# Patient Record
Sex: FEMALE | Race: WHITE | Employment: UNEMPLOYED | URBAN - METROPOLITAN AREA
[De-identification: names, ages, dates, MRNs, and addresses within clinical notes are randomized per-mention and may not be internally consistent; named-entity substitution may affect disease eponyms.]

---

## 2021-03-09 ENCOUNTER — OFFICE VISIT (OUTPATIENT)
Dept: FAMILY MEDICINE CLINIC | Age: 61
End: 2021-03-09
Payer: MEDICAID

## 2021-03-09 VITALS
WEIGHT: 187.6 LBS | DIASTOLIC BLOOD PRESSURE: 55 MMHG | SYSTOLIC BLOOD PRESSURE: 103 MMHG | TEMPERATURE: 98.1 F | RESPIRATION RATE: 18 BRPM | HEIGHT: 68 IN | HEART RATE: 51 BPM | BODY MASS INDEX: 28.43 KG/M2 | OXYGEN SATURATION: 96 %

## 2021-03-09 DIAGNOSIS — M05.9 RHEUMATOID ARTHRITIS WITH POSITIVE RHEUMATOID FACTOR, INVOLVING UNSPECIFIED SITE (HCC): ICD-10-CM

## 2021-03-09 DIAGNOSIS — Z13.220 LIPID SCREENING: ICD-10-CM

## 2021-03-09 DIAGNOSIS — I48.91 ATRIAL FIBRILLATION, UNSPECIFIED TYPE (HCC): ICD-10-CM

## 2021-03-09 DIAGNOSIS — Z23 NEEDS FLU SHOT: ICD-10-CM

## 2021-03-09 DIAGNOSIS — K22.719 BARRETT'S ESOPHAGUS WITH DYSPLASIA: ICD-10-CM

## 2021-03-09 DIAGNOSIS — R73.03 PREDIABETES: ICD-10-CM

## 2021-03-09 DIAGNOSIS — I48.91 ATRIAL FIBRILLATION, UNSPECIFIED TYPE (HCC): Primary | ICD-10-CM

## 2021-03-09 DIAGNOSIS — M32.9 LUPUS (HCC): ICD-10-CM

## 2021-03-09 LAB
ALBUMIN SERPL-MCNC: 4.3 G/DL (ref 3.5–5.2)
ALP BLD-CCNC: 51 U/L (ref 35–104)
ALT SERPL-CCNC: 7 U/L (ref 0–32)
ANION GAP SERPL CALCULATED.3IONS-SCNC: 7 MMOL/L (ref 7–16)
AST SERPL-CCNC: 11 U/L (ref 0–31)
BASOPHILS ABSOLUTE: 0.03 E9/L (ref 0–0.2)
BASOPHILS RELATIVE PERCENT: 0.6 % (ref 0–2)
BILIRUB SERPL-MCNC: 0.3 MG/DL (ref 0–1.2)
BUN BLDV-MCNC: 18 MG/DL (ref 6–20)
CALCIUM SERPL-MCNC: 9.3 MG/DL (ref 8.6–10.2)
CHLORIDE BLD-SCNC: 102 MMOL/L (ref 98–107)
CHOLESTEROL, TOTAL: 208 MG/DL (ref 0–199)
CO2: 29 MMOL/L (ref 22–29)
CREAT SERPL-MCNC: 0.9 MG/DL (ref 0.5–1)
EOSINOPHILS ABSOLUTE: 0.11 E9/L (ref 0.05–0.5)
EOSINOPHILS RELATIVE PERCENT: 2 % (ref 0–6)
GFR AFRICAN AMERICAN: >60
GFR NON-AFRICAN AMERICAN: >60 ML/MIN/1.73
GLUCOSE BLD-MCNC: 96 MG/DL (ref 74–99)
HCT VFR BLD CALC: 42.7 % (ref 34–48)
HDLC SERPL-MCNC: 40 MG/DL
HEMOGLOBIN: 14 G/DL (ref 11.5–15.5)
IMMATURE GRANULOCYTES #: 0.01 E9/L
IMMATURE GRANULOCYTES %: 0.2 % (ref 0–5)
LDL CHOLESTEROL CALCULATED: 143 MG/DL (ref 0–99)
LYMPHOCYTES ABSOLUTE: 1.87 E9/L (ref 1.5–4)
LYMPHOCYTES RELATIVE PERCENT: 34.3 % (ref 20–42)
MCH RBC QN AUTO: 30.2 PG (ref 26–35)
MCHC RBC AUTO-ENTMCNC: 32.8 % (ref 32–34.5)
MCV RBC AUTO: 92 FL (ref 80–99.9)
MONOCYTES ABSOLUTE: 0.29 E9/L (ref 0.1–0.95)
MONOCYTES RELATIVE PERCENT: 5.3 % (ref 2–12)
NEUTROPHILS ABSOLUTE: 3.14 E9/L (ref 1.8–7.3)
NEUTROPHILS RELATIVE PERCENT: 57.6 % (ref 43–80)
PDW BLD-RTO: 12.8 FL (ref 11.5–15)
PLATELET # BLD: 158 E9/L (ref 130–450)
PMV BLD AUTO: 10.8 FL (ref 7–12)
POTASSIUM SERPL-SCNC: 4 MMOL/L (ref 3.5–5)
RBC # BLD: 4.64 E12/L (ref 3.5–5.5)
SODIUM BLD-SCNC: 138 MMOL/L (ref 132–146)
TOTAL PROTEIN: 7.1 G/DL (ref 6.4–8.3)
TRIGL SERPL-MCNC: 125 MG/DL (ref 0–149)
TSH SERPL DL<=0.05 MIU/L-ACNC: 2.15 UIU/ML (ref 0.27–4.2)
VLDLC SERPL CALC-MCNC: 25 MG/DL
WBC # BLD: 5.5 E9/L (ref 4.5–11.5)

## 2021-03-09 PROCEDURE — G8482 FLU IMMUNIZE ORDER/ADMIN: HCPCS | Performed by: FAMILY MEDICINE

## 2021-03-09 PROCEDURE — 36415 COLL VENOUS BLD VENIPUNCTURE: CPT | Performed by: FAMILY MEDICINE

## 2021-03-09 PROCEDURE — 99204 OFFICE O/P NEW MOD 45 MIN: CPT | Performed by: FAMILY MEDICINE

## 2021-03-09 PROCEDURE — 4004F PT TOBACCO SCREEN RCVD TLK: CPT | Performed by: FAMILY MEDICINE

## 2021-03-09 PROCEDURE — 90686 IIV4 VACC NO PRSV 0.5 ML IM: CPT | Performed by: FAMILY MEDICINE

## 2021-03-09 PROCEDURE — G8419 CALC BMI OUT NRM PARAM NOF/U: HCPCS | Performed by: FAMILY MEDICINE

## 2021-03-09 PROCEDURE — G8427 DOCREV CUR MEDS BY ELIG CLIN: HCPCS | Performed by: FAMILY MEDICINE

## 2021-03-09 PROCEDURE — 3017F COLORECTAL CA SCREEN DOC REV: CPT | Performed by: FAMILY MEDICINE

## 2021-03-09 PROCEDURE — 90471 IMMUNIZATION ADMIN: CPT | Performed by: FAMILY MEDICINE

## 2021-03-09 RX ORDER — PREDNISONE 1 MG/1
20 TABLET ORAL DAILY
Qty: 30 TABLET | Refills: 0 | Status: CANCELLED | OUTPATIENT
Start: 2021-03-09 | End: 2021-03-14

## 2021-03-09 RX ORDER — ESCITALOPRAM OXALATE 20 MG/1
20 TABLET ORAL DAILY
COMMUNITY
End: 2021-03-30 | Stop reason: SDUPTHER

## 2021-03-09 RX ORDER — OMEPRAZOLE 40 MG/1
40 CAPSULE, DELAYED RELEASE ORAL DAILY
COMMUNITY

## 2021-03-09 RX ORDER — HYDROXYCHLOROQUINE SULFATE 200 MG/1
200 TABLET, FILM COATED ORAL 2 TIMES DAILY
COMMUNITY

## 2021-03-09 RX ORDER — ACETAMINOPHEN 160 MG
2000 TABLET,DISINTEGRATING ORAL
COMMUNITY

## 2021-03-09 RX ORDER — TRAZODONE HYDROCHLORIDE 50 MG/1
50 TABLET ORAL NIGHTLY
COMMUNITY
End: 2022-02-16 | Stop reason: SDUPTHER

## 2021-03-09 RX ORDER — FOLIC ACID 1 MG/1
1 TABLET ORAL DAILY
COMMUNITY

## 2021-03-09 RX ORDER — METOPROLOL SUCCINATE 100 MG/1
100 TABLET, EXTENDED RELEASE ORAL DAILY
COMMUNITY
End: 2021-03-22

## 2021-03-09 RX ORDER — AMIODARONE HYDROCHLORIDE 200 MG/1
200 TABLET ORAL DAILY
COMMUNITY
End: 2021-04-21 | Stop reason: SDUPTHER

## 2021-03-09 ASSESSMENT — PATIENT HEALTH QUESTIONNAIRE - PHQ9: 2. FEELING DOWN, DEPRESSED OR HOPELESS: 0

## 2021-03-09 NOTE — PROGRESS NOTES
3/16/2021    Richar Leonard is a 64 y.o. femalehere for:    HPI:  CC- new patient, here to establish care. No medical records, but patient reports hx of stroke, lupus, yaya esophagus  EGD 3 years ago  Colonoscopy 3 years ago, polyps removed  No weight loss. No problems swallowing. No hematemesis. No voice changes. She is out of medications. Has been moved in [place] from [place] in November    Lupus diagnosed 15 years ago. Last Summer was last flare up  RA - Arthralgias generalized. Arthtiris has been well controlled  Prednisone 5 mg qd- off for 1 month now. Did not have any diarrhea or other sickness signs/symptoms    Stroke in 2010. That's when she was diagnosed with Afib. Was on warfarin, then xarelto, then eliquis. Had DVT before the stroke. Was told to be lifelong anticoagulated, but unsure if she had thrombophilia panel checked. Does have strong FHx of PE/DVT. Afib  On metoprolol ( usually takes 100 mg. If bp running low, takes 50 mg)   Takes Amiodarone as well  Used to follow regularly with cardiology and electrophysiology in [place]  Denies chest pain or palpitations  No gross bleeding    Pt on lexapro and trazodone.  Used to follow with neuropsych after the stroke as she had and continues to have slower processing of thoughts and outer information  Anxious  Mood is well controlled  Sleep is good with trazodone  No SI/HI      Pap smear: 2018 - had abnormal one 12 years ago, then all normal afterwards Mammogram 1 year and half ago- normal       BP Readings from Last 3 Encounters:   03/09/21 (!) 103/55     Current Outpatient Medications   Medication Sig Dispense Refill    Cholecalciferol (VITAMIN D3) 50 MCG (2000 UT) CAPS Take 2,000 Units by mouth      metoprolol succinate (TOPROL XL) 100 MG extended release tablet Take 100 mg by mouth daily Take 1 tablet by mouth daily      folic acid (FOLVITE) 1 MG tablet Take 1 mg by mouth daily      traZODone (DESYREL) 50 MG tablet Take 50 mg by mouth nightly      omeprazole (PRILOSEC) 40 MG delayed release capsule Take 40 mg by mouth daily Take 1 tablet by mouth daily      apixaban (ELIQUIS) 5 MG TABS tablet Take 5 mg by mouth 2 times daily Take 1 tablet by mouth BID      escitalopram (LEXAPRO) 20 MG tablet Take 20 mg by mouth daily Take 1 tablet by mouth every morning      hydroxychloroquine (PLAQUENIL) 200 MG tablet Take 200 mg by mouth 2 times daily Take 1 tablet by mouth twice a day      methotrexate (RHEUMATREX) 2.5 MG chemo tablet Take 2.5 mg by mouth once a week Take 6 tablets by mouth weekly      Multiple Minerals (CALCIUM/MAGNESIUM/ZINC PO) Take by mouth      amiodarone (CORDARONE) 200 MG tablet Take 200 mg by mouth daily Take 1 tablet by mouth daily       No current facility-administered medications for this visit. Allergies   Allergen Reactions    Morphine Other (See Comments)     Rapid drop in BP     Phenergan [Promethazine]     Ativan [Lorazepam] Anxiety    Penicillins Rash       Past Medical & Surgical History:      Diagnosis Date    Anxiety and depression     Atrial fibrillation ([facility]     Fried's esophagus with dysplasia     Hx of deep venous thrombosis     Lupus ([facility] [address]     Stroke [facility])      History reviewed. No pertinent surgical history.     Family History:      Problem Relation Age of Onset    Stroke Mother         minor    Colon Polyps Mother         precanceous    Heart Attack Father 58        ortic aneurism as well    Colon Cancer Brother 40    Deep Vein Thrombosis Brother         And PE    Stroke Brother     Colon Cancer Paternal Grandmother 80    Colon Cancer Brother 39    Deep Vein Thrombosis Brother     Seizures Brother        Social History:  Social History     Tobacco Use    Smoking status: Current Every Day Smoker     Packs/day: 0.25     Years: 40.00     Pack years: 10.00     Start date: 1980    Smokeless tobacco: Never Used   Substance Use Topics    Alcohol use: Not on file       Immunization History   Administered Date(s) Administered    Influenza, Quadv, IM, PF (6 mo and older Fluzone, Flulaval, Fluarix, and 3 yrs and older Afluria) 03/09/2021       Review of Systems   Constitutional: Negative for appetite change, chills, fever and unexpected weight change. HENT: Positive for tinnitus (chronic). Negative for congestion, sinus pressure and sore throat. Eyes: Negative for discharge and visual disturbance. Respiratory: Negative for cough, shortness of breath and wheezing. Cardiovascular: Negative for chest pain and leg swelling. Gastrointestinal: Negative for abdominal pain, blood in stool and vomiting. Endocrine: Negative for cold intolerance, heat intolerance and polyuria. Genitourinary: Negative for dysuria and hematuria. Musculoskeletal: Positive for arthralgias. Negative for neck pain and neck stiffness. Skin: Positive for rash (when having lupus flare ups). Negative for wound. Neurological: Negative for weakness, numbness and headaches. Psychiatric/Behavioral: Positive for confusion, decreased concentration and sleep disturbance. Negative for agitation and suicidal ideas. The patient is nervous/anxious. VS:  BP (!) 103/55   Pulse 51   Temp 98.1 °F (36.7 °C)   Resp 18   Ht 5' 7.5\" (1.715 m)   Wt 187 lb 9.6 oz (85.1 kg)   SpO2 96%   BMI 28.95 kg/m²     Physical Exam  Vitals signs reviewed. Constitutional:       General: She is not in acute distress. Appearance: Normal appearance. HENT:      Head: Normocephalic and atraumatic. Right Ear: Tympanic membrane normal.      Left Ear: Tympanic membrane normal.      Nose: No congestion. Mouth/Throat:      Pharynx: No oropharyngeal exudate or posterior oropharyngeal erythema. Eyes:      General: No scleral icterus. Conjunctiva/sclera: Conjunctivae normal.      Pupils: Pupils are equal, round, and reactive to light. Neck:      Musculoskeletal: Normal range of motion and neck supple. Cardiovascular:      Rate and Rhythm: Normal rate and regular rhythm. Heart sounds: Normal heart sounds. No murmur. Pulmonary:      Effort: Pulmonary effort is normal. No respiratory distress. Breath sounds: Normal breath sounds. No wheezing. Abdominal:      General: There is no distension. Palpations: Abdomen is soft. Tenderness: There is no abdominal tenderness. Musculoskeletal:      Right lower leg: No edema. Left lower leg: No edema. Lymphadenopathy:      Cervical: No cervical adenopathy. Skin:     General: Skin is warm. Findings: No rash. Neurological:      General: No focal deficit present. Mental Status: She is alert and oriented to person, place, and time. Cranial Nerves: No cranial nerve deficit. Motor: No weakness. Psychiatric:         Mood and Affect: Mood normal.         Behavior: Behavior normal.         Assessment/Plan:  Anthony Cross was seen today for new patient, tinnitus and lupus. Diagnoses and all orders for this visit:    Atrial fibrillation, unspecified type ([facility]  Sinus rhythm today. Continue with Eliquis, amiodarone. Continue taking Metoprolol 50 mg as instructed when having low bp  [id number] Score: 3  Referred to electrophysiology. -     CBC WITH AUTO DIFFERENTIAL; Future  -     COMPREHENSIVE METABOLIC PANEL; Future  -     TSH without Reflex; Future  -     [facility] - Alfredo Bridges DO, Electrophysiology, [place]    Fried's esophagus with dysplasia  Continue taking omeprazole. Due for EGD. Referred to general surgery. -     Marizol Dc MD, General Surgery, [facility]    Rheumatoid arthritis with positive rheumatoid factor, involving unspecified site [facility])  Continue methotrexate and prednisone.  Referred to rheumatolog    -     External Referral To Rheumatology    Lupus ([facility] [address]  As above   -     External Referral To Rheumatology    Needs flu shot  -     INFLUENZA, QUADV, 3 YRS AND OLDER, IM PF, PREFILL SYR OR SDV, 0.5ML (AFLURIA QUADV, PF)    Prediabetes  -     HEMOGLOBIN A1C; Future    Lipid screening  -     LIPID PANEL; Future    Medical Decision Making  Number and Complexity of Problems Addressed:   Stable Chronic Illnesses: afib, yaya esophagus, lupus, RA  Level of Problems Addressed: Moderate (1+ chronic illness with exacerbation, progression, or side effects of treatment / 2+ stable chronic illnesses / 1 undiagnosed new problem with uncertain prognosis / 1 acute illness with systemic symptoms / 1 acute complicated injury)  Amount and/or Complexity of Data to be Reviewed and Analyzed: Moderate (1 of 3 Categories) Category 1 (Any three of the following, can duplicate except historian) Same as Limited Category 1 plus Assessment Requiring Independent Historian / Category 2 Independent Interpretation of Tests (not separately reported) / Category 3 Discussion of Management or Test Interpretation with External Physician  Risk of Complications and/or Morbidity or Mortality of Patient Management:   Moderate risk of morbidity from additional diagnostic testing or treatment (ex. prescription drug management / minor surgery with risk factors / elective major surgery without risk factors / diagnosis or treatment significantly limited by social determinants of health)  Today's visit has a medical decision making level of Moderate. Follow up: In 2 weeks for anxiety and Health Maintenance     Patient agrees with the above stated plan. Toy Clay received counseling on the following healthy behaviors: nutrition, exercise and medication adherence.     Vince Mcclain MD  PGY-2 Family Medicine

## 2021-03-09 NOTE — PROGRESS NOTES
S: 64 y.o. female with   Chief Complaint   Patient presents with    New Patient    Tinnitus     constant - started a few months ago - started off as occasionally and now constant.  Lupus     out of medications      New to the area from [place]  Lupus - needs a rheumatologist.    Barrets esophagus   Hx stroke in 2010. Has Afib.    +tinnintus b/l. Mostly at night. O: VS:  height is 5' 7.5\" (1.715 m) and weight is 187 lb 9.6 oz (85.1 kg). Her temperature is 98.1 °F (36.7 °C). Her blood pressure is 103/55 (abnormal) and her pulse is 51. Her respiration is 18 and oxygen saturation is 96%. BP Readings from Last 3 Encounters:   03/09/21 (!) 103/55     See resident note      Impression/Plan:   1) Afib - decrease metoprolol, HR is ~50 and BP is low, cont DOAC. 2) Lupus - refer to rheum  3) Barrets - refer to surg/GI for surveillance  Obtain records. Health Maintenance Due   Topic Date Due    TSH testing  Never done    Hepatitis C screen  Never done    HIV screen  Never done    DTaP/Tdap/Td vaccine (1 - Tdap) Never done    Cervical cancer screen  Never done    Lipid screen  Never done    Diabetes screen  Never done    Breast cancer screen  Never done    Shingles Vaccine (1 of 2) Never done    Colon cancer screen colonoscopy  Never done    Flu vaccine (1) Never done         Attending Physician Statement  I have discussed the case, including pertinent history and exam findings with the resident. I agree with the documented assessment and plan.       J Carlos Arnold MD

## 2021-03-10 LAB — HBA1C MFR BLD: 6 % (ref 4–5.6)

## 2021-03-15 PROBLEM — I48.91 ATRIAL FIBRILLATION (HCC): Status: ACTIVE | Noted: 2021-03-15

## 2021-03-15 PROBLEM — Z86.73 REMOTE HISTORY OF STROKE: Status: ACTIVE | Noted: 2021-03-15

## 2021-03-15 PROBLEM — F32.A ANXIETY AND DEPRESSION: Status: ACTIVE | Noted: 2021-03-15

## 2021-03-15 PROBLEM — K22.719 BARRETT'S ESOPHAGUS WITH DYSPLASIA: Status: ACTIVE | Noted: 2021-03-15

## 2021-03-15 PROBLEM — Z86.718 HX OF DEEP VENOUS THROMBOSIS: Status: ACTIVE | Noted: 2021-03-15

## 2021-03-15 PROBLEM — F41.9 ANXIETY AND DEPRESSION: Status: ACTIVE | Noted: 2021-03-15

## 2021-03-15 PROBLEM — M05.9 RHEUMATOID ARTHRITIS WITH POSITIVE RHEUMATOID FACTOR (HCC): Status: ACTIVE | Noted: 2021-03-15

## 2021-03-15 PROBLEM — M32.9 LUPUS (HCC): Status: ACTIVE | Noted: 2021-03-15

## 2021-03-15 ASSESSMENT — ENCOUNTER SYMPTOMS
ABDOMINAL PAIN: 0
WHEEZING: 0
VOMITING: 0
EYE DISCHARGE: 0
SINUS PRESSURE: 0
SORE THROAT: 0
BLOOD IN STOOL: 0
SHORTNESS OF BREATH: 0
COUGH: 0

## 2021-03-17 ENCOUNTER — OFFICE VISIT (OUTPATIENT)
Dept: SURGERY | Age: 61
End: 2021-03-17
Payer: MEDICAID

## 2021-03-17 ENCOUNTER — TELEPHONE (OUTPATIENT)
Dept: SURGERY | Age: 61
End: 2021-03-17

## 2021-03-17 VITALS
WEIGHT: 187 LBS | BODY MASS INDEX: 29.35 KG/M2 | HEART RATE: 54 BPM | SYSTOLIC BLOOD PRESSURE: 102 MMHG | HEIGHT: 67 IN | DIASTOLIC BLOOD PRESSURE: 61 MMHG | RESPIRATION RATE: 16 BRPM | OXYGEN SATURATION: 97 % | TEMPERATURE: 97.5 F

## 2021-03-17 DIAGNOSIS — Z86.010 HX OF COLONIC POLYPS: ICD-10-CM

## 2021-03-17 DIAGNOSIS — K22.70 BARRETT'S ESOPHAGUS WITHOUT DYSPLASIA: Primary | ICD-10-CM

## 2021-03-17 PROCEDURE — G8482 FLU IMMUNIZE ORDER/ADMIN: HCPCS | Performed by: SURGERY

## 2021-03-17 PROCEDURE — G8419 CALC BMI OUT NRM PARAM NOF/U: HCPCS | Performed by: SURGERY

## 2021-03-17 PROCEDURE — 99244 OFF/OP CNSLTJ NEW/EST MOD 40: CPT | Performed by: SURGERY

## 2021-03-17 PROCEDURE — G8427 DOCREV CUR MEDS BY ELIG CLIN: HCPCS | Performed by: SURGERY

## 2021-03-17 RX ORDER — FAMOTIDINE 40 MG/1
40 TABLET, FILM COATED ORAL EVERY EVENING
Qty: 30 TABLET | Refills: 3 | Status: SHIPPED | OUTPATIENT
Start: 2021-03-17

## 2021-03-17 NOTE — TELEPHONE ENCOUNTER
Prior Authorization Form:      DEMOGRAPHICS:                     Patient Name:  Alexandra Aguilar  Patient :  1960            Insurance:  Payor: [facility] / Plan: Doctor kinetic / Product Type: *No Product type* /   Insurance ID Number:    Payor/Plan Subscr  Sex Relation Sub.  Ins. ID Effective Group Num   1. [facility] 1960 Female Self [id number]                                    [id number]         DIAGNOSIS & PROCEDURE:                       Procedure/Operation: Colonoscopy / EGD           CPT Code: 72360 / 98511    Diagnosis:  Barretts esophagus / dysphagia / h/o of colon polyps     ICD10 Code: K22.70 / R13.10 / Z86.010    Location:  [facility]    Surgeon:  Duc Bills    SCHEDULING INFORMATION:                          Date: 21    Time: 1100              Anesthesia:  MAC/TIVA                                                       Status:  Outpatient        Special Comments:        Electronically signed by Catherine Reyes MA on 3/17/2021 at 2:55 PM

## 2021-03-22 ENCOUNTER — OFFICE VISIT (OUTPATIENT)
Dept: NON INVASIVE DIAGNOSTICS | Age: 61
End: 2021-03-22
Payer: MEDICAID

## 2021-03-22 VITALS
HEART RATE: 51 BPM | RESPIRATION RATE: 16 BRPM | WEIGHT: 186.8 LBS | OXYGEN SATURATION: 96 % | HEIGHT: 67 IN | BODY MASS INDEX: 29.32 KG/M2 | SYSTOLIC BLOOD PRESSURE: 98 MMHG | DIASTOLIC BLOOD PRESSURE: 70 MMHG

## 2021-03-22 DIAGNOSIS — R94.31 EKG ABNORMALITIES: ICD-10-CM

## 2021-03-22 DIAGNOSIS — I48.91 ATRIAL FIBRILLATION, UNSPECIFIED TYPE (HCC): Primary | ICD-10-CM

## 2021-03-22 PROCEDURE — G8419 CALC BMI OUT NRM PARAM NOF/U: HCPCS | Performed by: INTERNAL MEDICINE

## 2021-03-22 PROCEDURE — 4004F PT TOBACCO SCREEN RCVD TLK: CPT | Performed by: INTERNAL MEDICINE

## 2021-03-22 PROCEDURE — 99204 OFFICE O/P NEW MOD 45 MIN: CPT | Performed by: INTERNAL MEDICINE

## 2021-03-22 PROCEDURE — G8482 FLU IMMUNIZE ORDER/ADMIN: HCPCS | Performed by: INTERNAL MEDICINE

## 2021-03-22 PROCEDURE — G8427 DOCREV CUR MEDS BY ELIG CLIN: HCPCS | Performed by: INTERNAL MEDICINE

## 2021-03-22 PROCEDURE — 3017F COLORECTAL CA SCREEN DOC REV: CPT | Performed by: INTERNAL MEDICINE

## 2021-03-22 PROCEDURE — 93000 ELECTROCARDIOGRAM COMPLETE: CPT | Performed by: INTERNAL MEDICINE

## 2021-03-22 RX ORDER — METOPROLOL SUCCINATE 50 MG/1
50 TABLET, EXTENDED RELEASE ORAL DAILY
COMMUNITY
End: 2021-05-04 | Stop reason: SDUPTHER

## 2021-03-22 ASSESSMENT — ENCOUNTER SYMPTOMS
NAUSEA: 0
CHEST TIGHTNESS: 0
COLOR CHANGE: 0
WHEEZING: 0
ABDOMINAL DISTENTION: 0
COUGH: 0
SINUS PRESSURE: 0
EYE REDNESS: 0
ABDOMINAL PAIN: 0
DIARRHEA: 0
SHORTNESS OF BREATH: 0

## 2021-03-22 NOTE — PROGRESS NOTES
Pt tolerated placement of 30 day preventice patch and understood use and return of device. [id number].   Electronically signed by Vivian Villagomez MA on 3/22/2021 at 2:50 PM

## 2021-03-22 NOTE — PROGRESS NOTES
precanceous    Heart Attack Father 58        ortic aneurism as well    Colon Cancer Brother 40    Deep Vein Thrombosis Brother         And PE    Stroke Brother     Colon Cancer Paternal Grandmother 80    Colon Cancer Brother 39    Deep Vein Thrombosis Brother     Seizures Brother        SOCIAL HISTORY   Social History     Socioeconomic History    Marital status:      Spouse name: Not on file    Number of children: Not on file    Years of education: Not on file    Highest education level: Not on file   Occupational History    Not on file   Social Needs    Financial resource strain: Not on file    Food insecurity     Worry: Not on file     Inability: Not on file    Transportation needs     Medical: Not on file     Non-medical: Not on file   Tobacco Use    Smoking status: Current Every Day Smoker     Packs/day: 0.25     Years: 40.00     Pack years: 10.00     Start date: 1980    Smokeless tobacco: Never Used   Substance and Sexual Activity    Alcohol use: Not Currently     Comment: rare glass of wine    Drug use: Not Currently    Sexual activity: Not on file   Lifestyle    Physical activity     Days per week: Not on file     Minutes per session: Not on file    Stress: Not on file   Relationships    Social connections     Talks on phone: Not on file     Gets together: Not on file     Attends [employer] service: Not on file     Active member of club or organization: Not on file     Attends meetings of clubs or organizations: Not on file     Relationship status: Not on file    Intimate partner violence     Fear of current or ex partner: Not on file     Emotionally abused: Not on file     Physically abused: Not on file     Forced sexual activity: Not on file   Other Topics Concern    Not on file   Social History Narrative    Not on file        History reviewed. No pertinent surgical history.     Current Outpatient Medications   Medication Sig Dispense Refill    metoprolol succinate (TOPROL XL) 50 MG extended release tablet Take 50 mg by mouth daily      famotidine (PEPCID) 40 MG tablet Take 1 tablet by mouth every evening 30 tablet 3    Cholecalciferol (VITAMIN D3) 50 MCG (2000 UT) CAPS Take 2,000 Units by mouth      folic acid (FOLVITE) 1 MG tablet Take 1 mg by mouth daily      traZODone (DESYREL) 50 MG tablet Take 50 mg by mouth nightly      omeprazole (PRILOSEC) 40 MG delayed release capsule Take 40 mg by mouth daily Take 1 tablet by mouth daily      apixaban (ELIQUIS) 5 MG TABS tablet Take 5 mg by mouth 2 times daily Take 1 tablet by mouth BID      escitalopram (LEXAPRO) 20 MG tablet Take 20 mg by mouth daily Take 1 tablet by mouth every morning      hydroxychloroquine (PLAQUENIL) 200 MG tablet Take 200 mg by mouth 2 times daily Take 1 tablet by mouth twice a day      methotrexate (RHEUMATREX) 2.5 MG chemo tablet Take 2.5 mg by mouth once a week Take 6 tablets by mouth weekly      Multiple Minerals (CALCIUM/MAGNESIUM/ZINC PO) Take by mouth      amiodarone (CORDARONE) 200 MG tablet Take 200 mg by mouth daily Take 1 tablet by mouth daily       No current facility-administered medications for this visit. Allergies   Allergen Reactions    Morphine Other (See Comments)     Rapid drop in BP     Phenergan [Promethazine]     Ativan [Lorazepam] Anxiety    Penicillins Rash           ROS:   Review of Systems   Constitutional: Negative for fatigue and fever. HENT: Negative for congestion, nosebleeds and sinus pressure. Eyes: Negative for redness and visual disturbance. Respiratory: Negative for cough, chest tightness, shortness of breath and wheezing. Cardiovascular: Positive for palpitations. Negative for chest pain and leg swelling. Gastrointestinal: Negative for abdominal distention, abdominal pain, diarrhea and nausea. Endocrine: Negative for cold intolerance, heat intolerance, polydipsia and polyphagia.    Genitourinary: Negative for difficulty urinating, frequency and urgency. Musculoskeletal: Negative for arthralgias, joint swelling and myalgias. Skin: Negative for color change and wound. Neurological: Negative for dizziness, syncope, weakness and numbness. Psychiatric/Behavioral: Negative for agitation, behavioral problems, confusion, decreased concentration, hallucinations and suicidal ideas. The patient is not nervous/anxious. PHYSICAL EXAM:  Vitals:    03/22/21 1357   BP: 98/70   Site: Right Upper Arm   Position: Sitting   Cuff Size: Medium Adult   Pulse: 51   Resp: 16   SpO2: 96%   Weight: 186 lb 12.8 oz (84.7 kg)   Height: 5' 7\" (1.702 m)     Physical Exam  Vitals signs reviewed. Constitutional:       Appearance: Normal appearance. HENT:      Head: Normocephalic. Mouth/Throat:      Mouth: Mucous membranes are moist.      Pharynx: Oropharynx is clear. Eyes:      Conjunctiva/sclera: Conjunctivae normal.   Neck:      Musculoskeletal: Normal range of motion and neck supple. Vascular: No carotid bruit. Cardiovascular:      Rate and Rhythm: Normal rate and regular rhythm. Pulses: Normal pulses. Heart sounds: Normal heart sounds. Pulmonary:      Effort: Pulmonary effort is normal.      Breath sounds: Normal breath sounds. No rales. Chest:      Chest wall: No tenderness. Abdominal:      General: Bowel sounds are normal.      Palpations: Abdomen is soft. Musculoskeletal: Normal range of motion. Skin:     General: Skin is warm. Neurological:      General: No focal deficit present. Mental Status: She is alert and oriented to person, place, and time. Psychiatric:         Mood and Affect: Mood normal.         Behavior: Behavior normal.         Thought Content:  Thought content normal.          Pertinent Labs:  CBC:   WBC (E9/L)   Date Value   03/09/2021 5.5     Hemoglobin (g/dL)   Date Value   03/09/2021 14.0     Hematocrit (%)   Date Value   03/09/2021 42.7     Platelets (F7/J)   Date Value   03/09/2021 158      BMP: Sodium (mmol/L)   Date Value   03/09/2021 138     Potassium (mmol/L)   Date Value   03/09/2021 4.0     Chloride (mmol/L)   Date Value   03/09/2021 102     CO2 (mmol/L)   Date Value   03/09/2021 29     BUN (mg/dL)   Date Value   03/09/2021 18     CREATININE (mg/dL)   Date Value   03/09/2021 0.9     Glucose (mg/dL)   Date Value   03/09/2021 96     Calcium (mg/dL)   Date Value   03/09/2021 9.3        TSH:   TSH (uIU/mL)   Date Value   03/09/2021 2.150      Lipid Profile:   Triglycerides (mg/dL)   Date Value   03/09/2021 125     HDL (mg/dL)   Date Value   03/09/2021 40     LDL Calculated (mg/dL)   Date Value   03/09/2021 143 (H)     Cholesterol, Total (mg/dL)   Date Value   03/09/2021 208 (H)      Hemoglobin A1C:   Hemoglobin A1C (%)   Date Value   03/09/2021 6.0 (H)           Pertinent Cardiac Testing:     ECG 3/22/2021: sinus bradycardia, rate: 51 bpm - see scanned cardiology    I have independently reviewed all of the ECGs and rhythm strips per above    I have personally reviewed the laboratory, cardiac diagnostic and radiographic testing as outlined above: We have requested previous records. 1. Atrial fibrillation, unspecified type ([facility] [address]         ASSESSMENT & PLAN    1. Atrial fibrillation  - Dx in 2010 following a CVA  - [id number] Score: 3  - On Eliquis, Amiodarone and Toprol PRN  - presents in [place].  - She is having increasing palpitations and has been on chronic amiodarone therapy. I recommended a 30 day event monitor to get a better sense of how much arrhythmia burden she has. Amiodarone is not the ideal choice for her given young age and deleterious effects long term. Her BP runs low and she does not really tolerate beta-blocker. She uses it prn ~ every 7 - 10 days.    - Check TTE to establish heart structure and function  - Re-education on importance of well controlled HTN (goal BP < 130/80), adequate weight control (goal BMI of < 27), physical activity consisting of moderate cardiopulmonary exercise up to a goal of 250 min/wk, daily compliance with CPAP in treating sleep apnea, smoking cessation and limited ETOH intake. 2. Lupus  - Dx 15 years ago  - was on Prednisone and currently off    3. CVA  - occurred 2010  - report memory deficits and right leg weakness    4. Hx of DVT  - prior to CVA in  2010    5. Barett's esophagus  - On omeprazole and following with general surgery    6. Anxiety/ depression  - Currently on Lexapro    7. Tobacco abuse  - reports using 1 pack a day  - encouraged cessation. Plan  1. No changes in medications at this time. 2. Will obtain a 30 day MCOT to determine any arrhythmias. 3. Will obtain a TTE for updated structure and function. 4. Follow up in 6 weeks or sooner PRN. Encouraged the patient to call the office for any questions or concerns. I have spent a total of 40 minutes with the patient and his/her family reviewing the above stated recommendations. A total of >50% of that time involved face-to-face time providing counseling and or coordination of care with the other providers. Thank you for allowing me to participate in your patient's care.     Andres Lester MD  Cardiac Electrophysiology  [address] [place] [address]

## 2021-03-23 ENCOUNTER — OFFICE VISIT (OUTPATIENT)
Dept: FAMILY MEDICINE CLINIC | Age: 61
End: 2021-03-23
Payer: MEDICAID

## 2021-03-23 VITALS
SYSTOLIC BLOOD PRESSURE: 114 MMHG | HEIGHT: 67 IN | BODY MASS INDEX: 29.35 KG/M2 | TEMPERATURE: 97.4 F | DIASTOLIC BLOOD PRESSURE: 62 MMHG | HEART RATE: 52 BPM | RESPIRATION RATE: 18 BRPM | OXYGEN SATURATION: 98 % | WEIGHT: 187 LBS

## 2021-03-23 DIAGNOSIS — Z12.31 ENCOUNTER FOR SCREENING MAMMOGRAM FOR MALIGNANT NEOPLASM OF BREAST: ICD-10-CM

## 2021-03-23 DIAGNOSIS — F41.9 ANXIETY AND DEPRESSION: Primary | ICD-10-CM

## 2021-03-23 DIAGNOSIS — F32.A ANXIETY AND DEPRESSION: Primary | ICD-10-CM

## 2021-03-23 PROCEDURE — G8482 FLU IMMUNIZE ORDER/ADMIN: HCPCS | Performed by: FAMILY MEDICINE

## 2021-03-23 PROCEDURE — G8419 CALC BMI OUT NRM PARAM NOF/U: HCPCS | Performed by: FAMILY MEDICINE

## 2021-03-23 PROCEDURE — G8427 DOCREV CUR MEDS BY ELIG CLIN: HCPCS | Performed by: FAMILY MEDICINE

## 2021-03-23 PROCEDURE — 99213 OFFICE O/P EST LOW 20 MIN: CPT | Performed by: FAMILY MEDICINE

## 2021-03-23 PROCEDURE — 3017F COLORECTAL CA SCREEN DOC REV: CPT | Performed by: FAMILY MEDICINE

## 2021-03-23 PROCEDURE — 4004F PT TOBACCO SCREEN RCVD TLK: CPT | Performed by: FAMILY MEDICINE

## 2021-03-23 RX ORDER — SODIUM CHLORIDE 9 MG/ML
INJECTION, SOLUTION INTRAVENOUS CONTINUOUS
Status: CANCELLED | OUTPATIENT
Start: 2021-03-23

## 2021-03-23 ASSESSMENT — ENCOUNTER SYMPTOMS
BLOOD IN STOOL: 0
SORE THROAT: 0
DIARRHEA: 0
EYE REDNESS: 0
BACK PAIN: 0
VOMITING: 0
ABDOMINAL PAIN: 0
COUGH: 0
WHEEZING: 0
PHOTOPHOBIA: 0
CONSTIPATION: 0
SHORTNESS OF BREATH: 0
NAUSEA: 0

## 2021-03-23 NOTE — PROGRESS NOTES
Consult Note    Dear Jimena Wallis MD, thank you for referring Delbert Marley for evaluation. Reason for Consult: History of Fried's, history of colon polyps    HISTORY OF PRESENT ILLNESS:    The patient is a 64 y.o. female who presents with reported history of Fried's esophagus with dysplasia. [place]. She denies having had any ablation type procedure. She reports her reflux is controlled presently on omeprazole. She also has history of colon polyps. She has history of 3 first-degree relatives with colon cancer. Denies any rectal bleeding diarrhea or constipation. Past Medical History:   Diagnosis Date    Anxiety and depression     Atrial fibrillation ([facility] [address]     Fried's esophagus with dysplasia     Hx of deep venous thrombosis     Lupus ([facility] [address] [facility]     Stroke [facility])        History reviewed. No pertinent surgical history. Prior to Admission medications    Medication Sig Start Date End Date Taking?  Authorizing Provider   famotidine (PEPCID) 40 MG tablet Take 1 tablet by mouth every evening 3/17/21  Yes Monica Cole DO   Cholecalciferol (VITAMIN D3) 50 MCG (2000 UT) CAPS Take 2,000 Units by mouth   Yes Historical Provider, MD   folic acid (FOLVITE) 1 MG tablet Take 1 mg by mouth daily   Yes Historical Provider, MD   traZODone (DESYREL) 50 MG tablet Take 50 mg by mouth nightly   Yes Historical Provider, MD   omeprazole (PRILOSEC) 40 MG delayed release capsule Take 40 mg by mouth daily Take 1 tablet by mouth daily   Yes Historical Provider, MD   apixaban (ELIQUIS) 5 MG TABS tablet Take 5 mg by mouth 2 times daily Take 1 tablet by mouth BID   Yes Historical Provider, MD   escitalopram (LEXAPRO) 20 MG tablet Take 20 mg by mouth daily Take 1 tablet by mouth every morning   Yes Historical Provider, MD   hydroxychloroquine (PLAQUENIL) 200 MG tablet Take 200 mg by mouth 2 times daily Take 1 tablet by mouth twice a day   Yes Historical Provider, MD methotrexate (RHEUMATREX) 2.5 MG chemo tablet Take 2.5 mg by mouth once a week Take 6 tablets by mouth weekly   Yes Historical Provider, MD   Multiple Minerals (CALCIUM/MAGNESIUM/ZINC PO) Take by mouth   Yes Historical Provider, MD   amiodarone (CORDARONE) 200 MG tablet Take 200 mg by mouth daily Take 1 tablet by mouth daily   Yes Historical Provider, MD   metoprolol succinate (TOPROL XL) 50 MG extended release tablet Take 50 mg by mouth daily    Historical Provider, MD       Scheduled Meds:  ContinuousInfusions:  PRN Meds:    Allergies   Allergen Reactions    Morphine Other (See Comments)     Rapid drop in BP     Phenergan [Promethazine]     Ativan [Lorazepam] Anxiety    Penicillins Rash       Social History     Socioeconomic History    Marital status:      Spouse name: Not on file    Number of children: Not on file    Years of education: Not on file    Highest education level: Not on file   Occupational History    Not on file   Social Needs    Financial resource strain: Not on file    Food insecurity     Worry: Not on file     Inability: Not on file    Transportation needs     Medical: Not on file     Non-medical: Not on file   Tobacco Use    Smoking status: Current Every Day Smoker     Packs/day: 0.25     Years: 40.00     Pack years: 10.00     Start date: 1980    Smokeless tobacco: Never Used   Substance and Sexual Activity    Alcohol use: Not Currently     Comment: rare glass of wine    Drug use: Not Currently    Sexual activity: Not on file   Lifestyle    Physical activity     Days per week: Not on file     Minutes per session: Not on file    Stress: Not on file   Relationships    Social connections     Talks on phone: Not on file     Gets together: Not on file     Attends Buddhism service: Not on file     Active member of club or organization: Not on file     Attends meetings of clubs or organizations: Not on file     Relationship status: Not on file    Intimate partner violence Fear of current or ex partner: Not on file     Emotionally abused: Not on file     Physically abused: Not on file     Forced sexual activity: Not on file   Other Topics Concern    Not on file   Social History Narrative    Not on file       Family History   Problem Relation Age of Onset    Stroke Mother         minor    Colon Polyps Mother         precanceous    Heart Attack Father 58        ortic aneurism as well    Colon Cancer Brother 40    Deep Vein Thrombosis Brother         And PE    Stroke Brother     Colon Cancer Paternal Grandmother 80    Colon Cancer Brother 39    Deep Vein Thrombosis Brother     Seizures Brother        Review Of Systems:   Review of Systems   Constitutional: Negative for chills and fever. HENT: Negative for ear pain, nosebleeds, sore throat and tinnitus. Eyes: Negative for photophobia and redness. Respiratory: Negative for cough, shortness of breath and wheezing. Cardiovascular: Negative for chest pain and palpitations. Gastrointestinal: Negative for abdominal pain, blood in stool, constipation, diarrhea, nausea and vomiting. Endocrine: Negative for polydipsia. Genitourinary: Negative for dysuria, hematuria and urgency. Musculoskeletal: Negative for back pain and neck pain. Skin: Negative for rash. Neurological: Negative for dizziness, tremors and seizures. Hematological: Does not bruise/bleed easily. All other systems reviewed and are negative.        Physical Exam:  Vitals:    03/17/21 1404   BP: 102/61   Site: Left Upper Arm   Position: Sitting   Cuff Size: Medium Adult   Pulse: 54   Resp: 16   Temp: 97.5 °F (36.4 °C)   TempSrc: Temporal   SpO2: 97%   Weight: 187 lb (84.8 kg)   Height: 5' 7\" (1.702 m)       General: Well nourished, well developed, no acute distress  Eyes:  PERRL   Conjunctiva unremarkable   ENT:  TM's intact bilaterally, no effusion   Nasal:  No mucosal edema     No nasal drainage   Oral:  mucosa moist and pink   Neck:  Supple   No palpable cervical lymphoadenopathy   Thyroid without mass or enlargement  Resp: Lungs CTAB   Equal and adequate air exchange without accessory muscle use   No rales, rhonchi or wheeze  CV: S1S2 RRR   No murmur   Intact distal pulses   No edema  GI: Abdomen Soft, non tender, non distended   Normoactive bowel sounds   No palpable hepatosplenomegaly  MS: Physiologic ROM of all extremities    Intact distal pulses   No clubbing or cyanosis   Skin Warm and dry; no rash or lesion  Neuro: Alert and oriented; normal and intact dtr's   Psych: Euthymic mood, congruent affect      No results found. Assessment/Plan:  3 61-year-old female with history of Fried's esophagus and possibly dysplasia. Also with history of colon polyps. We will plan for EGD and colonoscopy. The risks and benefits of the procedure were explained to the patient, including risks of bleeding and perforation. The patient expressed understanding of these risks.         Electronically signed by Roberto Arana DO on 3/23/21 at 10:06 AM EDT

## 2021-03-23 NOTE — PROGRESS NOTES
[facility]  Precepting Note    Subjective:  Mood  Hx of anx and dep over time. Hx of stroke 10 years ago, physical sequelae resolved but has had trouble with focus, memory, concentration. Too much stimulation causes anxiety and distraction. Mood is low as she will feel frustrated from these things. 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 Has followed with neuropsych. She continues lexapro 20mg. Also trazodone nightly prn 3-4 times weekly (25mg). Anx improved lately. Situations improved. ROS otherwise negative     Past medical, surgical, family and social history were reviewed, non-contributory, and unchanged unless otherwise stated. Objective:    /62   Pulse 52   Temp 97.4 °F (36.3 °C)   Resp 18   Ht 5' 7\" (1.702 m)   Wt 187 lb (84.8 kg)   SpO2 98%   BMI 29.29 kg/m²     Exam is as noted by resident with the following changes, additions or corrections:    General:  NAD; alert & oriented x 3   Heart:  RRR, no murmurs, gallops, or rubs. Lungs:  CTA bilaterally, no wheeze, rales or rhonchi  Abd: bowel sounds present, nontender, nondistended, no masses  Extrem:  No clubbing, cyanosis, or edema    Assessment/Plan:  Depression and anxiety  Continues lexapro. Trying to switch to melatonin from trazodone. Counseling discussed, psychology list given. Adjusting. No SI or HI. Attending Physician Statement  I have reviewed the chart, including any radiology or labs. I have discussed the case, including pertinent history and exam findings with the resident. I agree with the assessment, plan and orders as documented by the resident. Please refer to the resident note for additional information.       Electronically signed by José Miguel Mayen MD on 3/23/2021 at 10:39 AM

## 2021-03-23 NOTE — PROGRESS NOTES
3/23/2021    Johanna Joya is a 64 y.o. femalehere for:    HPI:  CC- anxiety  Pt on lexapro and trazodone. Used to follow with neuropsych after the stroke as she had and continues to have slower processing of thoughts and outer information  Anxious  Mood is well controlled  Sleep is good with trazodone  No SI/HI  Has been on lexapro 20 mg for the past   Used to be on zoloft for adjustment/situational depression. Switched to lexapro 3 years ago  On Trazodone because of not having full night sleep. Not taking it all nights, taking half a pill sometimes. Takes it around 3-4 times weekly. 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 Rachel Rowland- enjoys music but crowd noise puts her in confusion, loses focus/concentration  Can not multitask   These are all residual from the stroke. There is no focal deficit residual otherwise    Pap smear: 2018 - had abnormal one 12 years ago, then all normal afterwards.  Mammogram 1 year and half ago- normal         BP Readings from Last 3 Encounters:   03/22/21 98/70   03/17/21 102/61   03/09/21 (!) 103/55     Current Outpatient Medications   Medication Sig Dispense Refill    metoprolol succinate (TOPROL XL) 50 MG extended release tablet Take 50 mg by mouth daily      famotidine (PEPCID) 40 MG tablet Take 1 tablet by mouth every evening 30 tablet 3    Cholecalciferol (VITAMIN D3) 50 MCG (2000 UT) CAPS Take 2,000 Units by mouth      folic acid (FOLVITE) 1 MG tablet Take 1 mg by mouth daily      traZODone (DESYREL) 50 MG tablet Take 50 mg by mouth nightly      omeprazole (PRILOSEC) 40 MG delayed release capsule Take 40 mg by mouth daily Take 1 tablet by mouth daily      apixaban (ELIQUIS) 5 MG TABS tablet Take 5 mg by mouth 2 times

## 2021-03-23 NOTE — H&P
HISTORY OF PRESENT ILLNESS:    The patient is a 64 y.o. female who presents with reported history of Fried's esophagus with dysplasia. [place] She denies having had any ablation type procedure. She reports her reflux is controlled presently on omeprazole. She also has history of colon polyps. She has history of 3 first-degree relatives with colon cancer. Denies any rectal bleeding diarrhea or constipation. Past Medical History:   Diagnosis Date    Anxiety and depression     Atrial fibrillation ([facility] [address]     Fried's esophagus with dysplasia     Hx of deep venous thrombosis     Lupus ([facility] [address]     Stroke [facility])        History reviewed. No pertinent surgical history. Prior to Admission medications    Medication Sig Start Date End Date Taking?  Authorizing Provider   famotidine (PEPCID) 40 MG tablet Take 1 tablet by mouth every evening 3/17/21  Yes Annalise Ortiz DO   Cholecalciferol (VITAMIN D3) 50 MCG (2000 UT) CAPS Take 2,000 Units by mouth   Yes Historical Provider, MD   folic acid (FOLVITE) 1 MG tablet Take 1 mg by mouth daily   Yes Historical Provider, MD   traZODone (DESYREL) 50 MG tablet Take 50 mg by mouth nightly   Yes Historical Provider, MD   omeprazole (PRILOSEC) 40 MG delayed release capsule Take 40 mg by mouth daily Take 1 tablet by mouth daily   Yes Historical Provider, MD   apixaban (ELIQUIS) 5 MG TABS tablet Take 5 mg by mouth 2 times daily Take 1 tablet by mouth BID   Yes Historical Provider, MD   escitalopram (LEXAPRO) 20 MG tablet Take 20 mg by mouth daily Take 1 tablet by mouth every morning   Yes Historical Provider, MD   hydroxychloroquine (PLAQUENIL) 200 MG tablet Take 200 mg by mouth 2 times daily Take 1 tablet by mouth twice a day   Yes Historical Provider, MD   methotrexate (RHEUMATREX) 2.5 MG chemo tablet Take 2.5 mg by mouth once a week Take 6 tablets by mouth weekly   Yes Historical Provider, MD   Multiple Minerals (CALCIUM/MAGNESIUM/ZINC PO) Take by mouth   Yes Historical Provider, MD   amiodarone (CORDARONE) 200 MG tablet Take 200 mg by mouth daily Take 1 tablet by mouth daily   Yes Historical Provider, MD   metoprolol succinate (TOPROL XL) 50 MG extended release tablet Take 50 mg by mouth daily    Historical Provider, MD       Scheduled Meds:  ContinuousInfusions:  PRN Meds:    Allergies   Allergen Reactions    Morphine Other (See Comments)     Rapid drop in BP     Phenergan [Promethazine]     Ativan [Lorazepam] Anxiety    Penicillins Rash       Social History     Socioeconomic History    Marital status:      Spouse name: Not on file    Number of children: Not on file    Years of education: Not on file    Highest education level: Not on file   Occupational History    Not on file   Social Needs    Financial resource strain: Not on file    Food insecurity     Worry: Not on file     Inability: Not on file    Transportation needs     Medical: Not on file     Non-medical: Not on file   Tobacco Use    Smoking status: Current Every Day Smoker     Packs/day: 0.25     Years: 40.00     Pack years: 10.00     Start date: 1980    Smokeless tobacco: Never Used   Substance and Sexual Activity    Alcohol use: Not Currently     Comment: rare glass of wine    Drug use: Not Currently    Sexual activity: Not on file   Lifestyle    Physical activity     Days per week: Not on file     Minutes per session: Not on file    Stress: Not on file   Relationships    Social connections     Talks on phone: Not on file     Gets together: Not on file     Attends [employer] service: Not on file     Active member of club or organization: Not on file     Attends meetings of clubs or organizations: Not on file     Relationship status: Not on file    Intimate partner violence     Fear of current or ex partner: Not on file     Emotionally abused: Not on file     Physically abused: Not on file     Forced sexual activity: Not on file Other Topics Concern    Not on file   Social History Narrative    Not on file       Family History   Problem Relation Age of Onset    Stroke Mother         minor    Colon Polyps Mother         precanceous    Heart Attack Father 58        ortic aneurism as well    Colon Cancer Brother 40    Deep Vein Thrombosis Brother         And PE    Stroke Brother     Colon Cancer Paternal Grandmother 80    Colon Cancer Brother 39    Deep Vein Thrombosis Brother     Seizures Brother        Review Of Systems:   Review of Systems   Constitutional: Negative for chills and fever. HENT: Negative for ear pain, nosebleeds, sore throat and tinnitus. Eyes: Negative for photophobia and redness. Respiratory: Negative for cough, shortness of breath and wheezing. Cardiovascular: Negative for chest pain and palpitations. Gastrointestinal: Negative for abdominal pain, blood in stool, constipation, diarrhea, nausea and vomiting. Endocrine: Negative for polydipsia. Genitourinary: Negative for dysuria, hematuria and urgency. Musculoskeletal: Negative for back pain and neck pain. Skin: Negative for rash. Neurological: Negative for dizziness, tremors and seizures. Hematological: Does not bruise/bleed easily. All other systems reviewed and are negative.        Physical Exam:  Vitals:    03/17/21 1404   BP: 102/61   Site: Left Upper Arm   Position: Sitting   Cuff Size: Medium Adult   Pulse: 54   Resp: 16   Temp: 97.5 °F (36.4 °C)   TempSrc: Temporal   SpO2: 97%   Weight: 187 lb (84.8 kg)   Height: 5' 7\" (1.702 m)       General: Well nourished, well developed, no acute distress  Eyes:  PERRL   Conjunctiva unremarkable   ENT:  TM's intact bilaterally, no effusion   Nasal:  No mucosal edema     No nasal drainage   Oral:  mucosa moist and pink   Neck:  Supple   No palpable cervical lymphoadenopathy   Thyroid without mass or enlargement  Resp: Lungs CTAB   Equal and adequate air exchange without accessory muscle use No rales, rhonchi or wheeze  CV: S1S2 RRR   No murmur   Intact distal pulses   No edema  GI: Abdomen Soft, non tender, non distended   Normoactive bowel sounds   No palpable hepatosplenomegaly  MS: Physiologic ROM of all extremities    Intact distal pulses   No clubbing or cyanosis   Skin Warm and dry; no rash or lesion  Neuro: Alert and oriented; normal and intact dtr's   Psych: Euthymic mood, congruent affect      No results found. Assessment/Plan:  3 62-year-old female with history of Fried's esophagus and possibly dysplasia. Also with history of colon polyps. We will plan for EGD and colonoscopy. The risks and benefits of the procedure were explained to the patient, including risks of bleeding and perforation. The patient expressed understanding of these risks.

## 2021-03-30 ENCOUNTER — OFFICE VISIT (OUTPATIENT)
Dept: FAMILY MEDICINE CLINIC | Age: 61
End: 2021-03-30
Payer: MEDICAID

## 2021-03-30 VITALS
RESPIRATION RATE: 16 BRPM | HEIGHT: 67 IN | TEMPERATURE: 96.9 F | HEART RATE: 67 BPM | WEIGHT: 184 LBS | OXYGEN SATURATION: 98 % | DIASTOLIC BLOOD PRESSURE: 66 MMHG | BODY MASS INDEX: 28.88 KG/M2 | SYSTOLIC BLOOD PRESSURE: 114 MMHG

## 2021-03-30 DIAGNOSIS — Z01.419 WELL WOMAN EXAM WITH ROUTINE GYNECOLOGICAL EXAM: Primary | ICD-10-CM

## 2021-03-30 DIAGNOSIS — F41.9 ANXIETY AND DEPRESSION: ICD-10-CM

## 2021-03-30 DIAGNOSIS — F32.A ANXIETY AND DEPRESSION: ICD-10-CM

## 2021-03-30 PROCEDURE — 99396 PREV VISIT EST AGE 40-64: CPT | Performed by: FAMILY MEDICINE

## 2021-03-30 PROCEDURE — G8482 FLU IMMUNIZE ORDER/ADMIN: HCPCS | Performed by: FAMILY MEDICINE

## 2021-03-30 RX ORDER — ESCITALOPRAM OXALATE 20 MG/1
20 TABLET ORAL DAILY
Qty: 30 TABLET | Refills: 1 | Status: SHIPPED
Start: 2021-03-30 | End: 2021-06-02

## 2021-03-30 NOTE — PROGRESS NOTES
S: 64 y.o. female with   Chief Complaint   Patient presents with   [facility] Gynecologic Exam       H6O8 here for PAP. Had abn 12y prior but not other abn. Mammo ordered will get this done. No problems to report. O: VS:  height is 5' 7\" (1.702 m) and weight is 184 lb (83.5 kg). Her temporal temperature is 96.9 °F (36.1 °C). Her blood pressure is 114/66 and her pulse is 67. Her respiration is 16 and oxygen saturation is 98%. BP Readings from Last 3 Encounters:   03/30/21 114/66   03/23/21 114/62   03/22/21 98/70     See resident note      Impression/Plan:   1) Annual Exam - PAP collected. Health Maintenance Due   Topic Date Due    Hepatitis C screen  Never done    Pneumococcal 0-64 years Vaccine (1 of 1 - PPSV23) Never done    HIV screen  Never done    COVID-19 Vaccine (1) Never done    DTaP/Tdap/Td vaccine (1 - Tdap) Never done    Cervical cancer screen  Never done    Breast cancer screen  Never done    Shingles Vaccine (1 of 2) Never done         Attending Physician Statement  I have discussed the case, including pertinent history and exam findings with the resident. I also have seen the patient and performed key portions of the examination. I agree with the documented assessment and plan.       Linda Liao MD
GYN ROS: normal menses, no abnormal bleeding, pelvic pain or discharge, no breast pain or new or enlarging lumps on self exam.   No neurological complaints. OBJECTIVE:   The patient appears well, alert, oriented x 3, in no distress. /66   Pulse 67   Temp 96.9 °F (36.1 °C) (Temporal)   Resp 16   Ht 5' 7\" (1.702 m)   Wt 184 lb (83.5 kg)   SpO2 98%   BMI 28.82 kg/m²    Vital signs reviewed   ENT normal.  Neck supple. No adenopathy or thyromegaly. ASHLIE. Lungs are clear, good air entry, no wheezes, rhonchi or rales. S1 and S2 normal, no murmurs, regular rate and rhythm. Abdomen soft without tenderness, guarding, mass or organomegaly. Extremities show no edema, normal peripheral pulses. Neurological is normal, no focal findings. BREAST EXAM: breasts appear normal, no suspicious masses, no skin or nipple changes or axillary nodes    PELVIC EXAM: normal external genitalia, vulva, vagina, adnexa. Visible lesions noted around the cervix with discoloration. Await PAP results    ASSESSMENT AND PLAN:   Javi Whitt was seen today for gynecologic exam.    Diagnoses and all orders for this visit:    Well woman exam with routine gynecological exam  PAP smear collected. Await results. Patient did have visible lesions. Could be Nabothian cysts, but will wait for pathology interpretation. Mammogram ordered last time. Will have it done soon. Patient refusing tetanus and pneumococcal. States she had those done before, in [place]. Will obtain records. -     PAP SMEAR    Anxiety and depression  Well controlled. Refill   -     escitalopram (LEXAPRO) 20 MG tablet; Take 1 tablet by mouth daily Take 1 tablet by mouth every morning    Follow up in 3 months for anxiety/prediabetes.     Will Love MD  Family Medicine, PGY-2

## 2021-04-02 LAB
HPV SAMPLE: NORMAL
HPV TYPE 16: NOT DETECTED
HPV TYPE 18: NOT DETECTED
HPV, HIGH RISK OTHER: NOT DETECTED
INTERPRETATION: NORMAL
SOURCE: NORMAL

## 2021-04-05 DIAGNOSIS — I48.91 ATRIAL FIBRILLATION, UNSPECIFIED TYPE (HCC): ICD-10-CM

## 2021-04-10 ASSESSMENT — ENCOUNTER SYMPTOMS
COUGH: 0
CONSTIPATION: 0
SINUS PRESSURE: 0
WHEEZING: 0
NAUSEA: 0
VOMITING: 0
ABDOMINAL PAIN: 0
DIARRHEA: 0
SHORTNESS OF BREATH: 0
BLOOD IN STOOL: 0

## 2021-04-21 RX ORDER — AMIODARONE HYDROCHLORIDE 200 MG/1
200 TABLET ORAL DAILY
Qty: 30 TABLET | Refills: 1 | Status: SHIPPED
Start: 2021-04-21 | End: 2021-05-04 | Stop reason: ALTCHOICE

## 2021-05-04 ENCOUNTER — TELEPHONE (OUTPATIENT)
Dept: NON INVASIVE DIAGNOSTICS | Age: 61
End: 2021-05-04

## 2021-05-04 RX ORDER — METOPROLOL SUCCINATE 50 MG/1
TABLET, EXTENDED RELEASE ORAL
Qty: 45 TABLET | Refills: 5 | Status: SHIPPED
Start: 2021-05-04 | End: 2021-10-29 | Stop reason: SDUPTHER

## 2021-05-04 NOTE — TELEPHONE ENCOUNTER
----- Message from Francisco Borja MD sent at 5/3/2021  2:21 PM EDT -----  She can increase metoprolol to 50 mg AM and 25 mg pm

## 2021-05-06 ENCOUNTER — TELEPHONE (OUTPATIENT)
Dept: SURGERY | Age: 61
End: 2021-05-06

## 2021-05-06 NOTE — TELEPHONE ENCOUNTER
Procedure date change to [email or web address]  Electronically signed by Zurdo Velazco on 5/6/21 at 9:09 AM EDT

## 2021-05-07 ENCOUNTER — TELEPHONE (OUTPATIENT)
Dept: CARDIOLOGY | Age: 61
End: 2021-05-07

## 2021-05-31 DIAGNOSIS — F41.9 ANXIETY AND DEPRESSION: ICD-10-CM

## 2021-05-31 DIAGNOSIS — F32.A ANXIETY AND DEPRESSION: ICD-10-CM

## 2021-06-02 RX ORDER — ESCITALOPRAM OXALATE 20 MG/1
TABLET ORAL
Qty: 30 TABLET | Refills: 1 | Status: SHIPPED
Start: 2021-06-02 | End: 2021-08-17

## 2021-06-08 ENCOUNTER — TELEPHONE (OUTPATIENT)
Dept: ADMINISTRATIVE | Age: 61
End: 2021-06-08

## 2021-08-11 ENCOUNTER — HOSPITAL ENCOUNTER (OUTPATIENT)
Age: 61
Discharge: HOME | End: 2021-08-13
Payer: MEDICAID

## 2021-08-11 ENCOUNTER — OFFICE VISIT (OUTPATIENT)
Dept: FAMILY MEDICINE CLINIC | Age: 61
End: 2021-08-11
Payer: MEDICAID

## 2021-08-11 ENCOUNTER — HOSPITAL ENCOUNTER (OUTPATIENT)
Dept: GENERAL RADIOLOGY | Age: 61
Discharge: HOME | End: 2021-08-13
Payer: MEDICAID

## 2021-08-11 VITALS
HEIGHT: 67 IN | TEMPERATURE: 98 F | SYSTOLIC BLOOD PRESSURE: 102 MMHG | DIASTOLIC BLOOD PRESSURE: 60 MMHG | WEIGHT: 166.6 LBS | BODY MASS INDEX: 26.15 KG/M2 | HEART RATE: 54 BPM | RESPIRATION RATE: 18 BRPM | OXYGEN SATURATION: 98 %

## 2021-08-11 DIAGNOSIS — F32.A ANXIETY AND DEPRESSION: Primary | ICD-10-CM

## 2021-08-11 DIAGNOSIS — S20.211A CONTUSION OF RIGHT CHEST WALL, INITIAL ENCOUNTER: ICD-10-CM

## 2021-08-11 DIAGNOSIS — R73.03 PREDIABETES: ICD-10-CM

## 2021-08-11 DIAGNOSIS — M05.9 RHEUMATOID ARTHRITIS WITH POSITIVE RHEUMATOID FACTOR, INVOLVING UNSPECIFIED SITE (HCC): ICD-10-CM

## 2021-08-11 DIAGNOSIS — M32.9 LUPUS (HCC): ICD-10-CM

## 2021-08-11 DIAGNOSIS — I48.91 ATRIAL FIBRILLATION, UNSPECIFIED TYPE (HCC): ICD-10-CM

## 2021-08-11 DIAGNOSIS — F41.9 ANXIETY AND DEPRESSION: Primary | ICD-10-CM

## 2021-08-11 LAB — HBA1C MFR BLD: 5.9 %

## 2021-08-11 PROCEDURE — G8427 DOCREV CUR MEDS BY ELIG CLIN: HCPCS | Performed by: FAMILY MEDICINE

## 2021-08-11 PROCEDURE — 3017F COLORECTAL CA SCREEN DOC REV: CPT | Performed by: FAMILY MEDICINE

## 2021-08-11 PROCEDURE — 71046 X-RAY EXAM CHEST 2 VIEWS: CPT

## 2021-08-11 PROCEDURE — 99214 OFFICE O/P EST MOD 30 MIN: CPT | Performed by: FAMILY MEDICINE

## 2021-08-11 PROCEDURE — G8419 CALC BMI OUT NRM PARAM NOF/U: HCPCS | Performed by: FAMILY MEDICINE

## 2021-08-11 PROCEDURE — 4004F PT TOBACCO SCREEN RCVD TLK: CPT | Performed by: FAMILY MEDICINE

## 2021-08-11 PROCEDURE — 83036 HEMOGLOBIN GLYCOSYLATED A1C: CPT | Performed by: FAMILY MEDICINE

## 2021-08-11 RX ORDER — LIDOCAINE 50 MG/G
1 PATCH TOPICAL DAILY
Qty: 10 PATCH | Refills: 0 | Status: SHIPPED | OUTPATIENT
Start: 2021-08-11 | End: 2021-08-21

## 2021-08-11 NOTE — PROGRESS NOTES
8/11/2021    Tiffani Whitten is a 63 y.o. femalehere for:    HPI:  CC- Anxiety  Well controlled on lexapro 20 mg   On Trazodone because of not having full night sleep. Takes it nightly, helps a lot  No SI    Fall 2 days ago  While working on the garden, stepped bad on the shovel, so she accidentally fell over the shovel with the right side of the body especially right breast. Now has pain around that area   Pain exacerbates with breathing  Also movements of the right arm triggers the pain  No cough, shortness of breath, dizziness, numbness or weakness. Hx of dvt. Afib. On Eliquis but does not have any gross bleeding. Cutting carbs, stopped drinking sodas. Has lost weight intentionally   Last A1C 6%. Today 5.9%    Follows with rheumatology for lupus in [facility]  Would like to establish care with rheumatology around her area. Wt Readings from Last 3 Encounters:   08/11/21 166 lb 9.6 oz (75.6 kg)   03/30/21 184 lb (83.5 kg)   03/23/21 187 lb (84.8 kg)       BP Readings from Last 3 Encounters:   08/11/21 102/60   03/30/21 114/66   03/23/21 114/62     Current Outpatient Medications   Medication Sig Dispense Refill    escitalopram (LEXAPRO) 20 MG tablet TAKE 1 TABLET BY MOUTH EVERY MORNING 30 tablet 1    metoprolol succinate (TOPROL XL) 50 MG extended release tablet Take 1 tablet by mouth every morning AND 0.5 tablets Daily with supper. Indications:  Take 1 tab in AM, Take half tab in PM. 45 tablet 5    famotidine (PEPCID) 40 MG tablet Take 1 tablet by mouth every evening 30 tablet 3    Cholecalciferol (VITAMIN D3) 50 MCG (2000 UT) CAPS Take 2,000 Units by mouth      folic acid (FOLVITE) 1 MG tablet Take 1 mg by mouth daily      traZODone (DESYREL) 50 MG tablet Take 50 mg by mouth nightly      omeprazole (PRILOSEC) 40 MG delayed release capsule Take 40 mg by mouth daily Take 1 tablet by mouth daily      apixaban (ELIQUIS) 5 MG TABS tablet Take 5 mg by mouth 2 times daily Take 1 tablet by mouth BID  hydroxychloroquine (PLAQUENIL) 200 MG tablet Take 200 mg by mouth 2 times daily Take 1 tablet by mouth twice a day      methotrexate (RHEUMATREX) 2.5 MG chemo tablet Take 2.5 mg by mouth once a week Take 6 tablets by mouth weekly      Multiple Minerals (CALCIUM/MAGNESIUM/ZINC PO) Take by mouth       No current facility-administered medications for this visit. Allergies   Allergen Reactions    Morphine Other (See Comments)     Rapid drop in BP     Phenergan [Promethazine]     Ativan [Lorazepam] Anxiety    Penicillins Rash       Past Medical & Surgical History:      Diagnosis Date    Anxiety and depression     Atrial fibrillation ([facility]     Fried's esophagus with dysplasia     COVID-19 04/04/2021    Hx of deep venous thrombosis     Lupus ([facility]     Stroke ([facility]      No past surgical history on file. Family History:      Problem Relation Age of Onset    Stroke Mother         minor    Colon Polyps Mother         precanceous    Heart Attack Father 58        ortic aneurism as well    Colon Cancer Brother 40    Deep Vein Thrombosis Brother         And PE    Stroke Brother     Colon Cancer Paternal Grandmother 80    Colon Cancer Brother 39    Deep Vein Thrombosis Brother     Seizures Brother        Social History:  Social History     Tobacco Use    Smoking status: Current Every Day Smoker     Packs/day: 0.25     Years: 40.00     Pack years: 10.00     Start date: 1980    Smokeless tobacco: Never Used   Substance Use Topics    Alcohol use: Not Currently     Comment: rare glass of wine       Immunization History   Administered Date(s) Administered    Influenza, Quadv, IM, PF (6 mo and older Fluzone, Flulaval, Fluarix, and 3 yrs and older Afluria) 03/09/2021       Review of Systems   Constitutional: Negative for chills and fever. HENT: Negative for congestion. Eyes: Negative for visual disturbance. Respiratory: Negative for cough and shortness of breath.          Chest wall pain Cardiovascular: Negative for palpitations and leg swelling. Gastrointestinal: Negative for abdominal pain, constipation, diarrhea and vomiting. Musculoskeletal: Positive for arthralgias and myalgias. Skin: Negative for rash. Neurological: Negative for weakness and numbness. Psychiatric/Behavioral: Negative for dysphoric mood (well controlled on meds), sleep disturbance (well controlled on trazodone) and suicidal ideas. The patient is not nervous/anxious (well controlled on meds). VS:  /60   Pulse 54   Temp 98 °F (36.7 °C)   Resp 18   Ht 5' 7\" (1.702 m)   Wt 166 lb 9.6 oz (75.6 kg)   SpO2 98%   BMI 26.09 kg/m²     Physical Exam  Vitals reviewed. Constitutional:       General: She is in acute distress (mild distress because of not being comfortable changing positions due to chest wall pain from the recent fall). Appearance: Normal appearance. She is not ill-appearing. HENT:      Head: Normocephalic and atraumatic. Mouth/Throat:      Mouth: Mucous membranes are moist.   Cardiovascular:      Rate and Rhythm: Normal rate and regular rhythm. Heart sounds: Normal heart sounds. No murmur heard. Pulmonary:      Effort: Pulmonary effort is normal. No respiratory distress. Breath sounds: Normal breath sounds. No wheezing or rhonchi. Comments: Right chest wall tenderness on palpation. No bruising. No crepitus. No open wounds. Abdominal:      General: There is no distension. Palpations: Abdomen is soft. Tenderness: There is no abdominal tenderness. Musculoskeletal:         General: Normal range of motion. Cervical back: Normal range of motion and neck supple. No rigidity. Right lower leg: No edema. Left lower leg: No edema. Comments: Right shoulder with intact ROM but triggers pain over right breast/chest wall area. No pain on palpation   Skin:     Findings: No rash. Neurological:      General: No focal deficit present. Mental Status: She is alert and oriented to person, place, and time. Sensory: No sensory deficit. Motor: No weakness. Psychiatric:         Mood and Affect: Mood normal.         Behavior: Behavior normal.         Assessment/Plan:  Jessica Umanzor was seen today for anxiety, discuss labs and chest pain. Diagnoses and all orders for this visit:    Anxiety and depression  Well controlled. Continue with current regimen. Contusion of right chest wall, initial encounter  Check CXR to rule out any acute processes and/or rib fracture. In the meantime continue with tylenol as needed for pain and Lidocaine patches locally. -     XR CHEST STANDARD (2 VW); Future  -     lidocaine (LIDODERM) 5 %; Place 1 patch onto the skin daily for 10 days 12 hours on, 12 hours off. Prediabetes  Improved with the intentional weight loss. Encouraged to continue pursuing healthy lifestyle. -     POCT glycosylated hemoglobin (Hb A1C)    Atrial fibrillation, unspecified type ([address]  Refill. No palpitations. Today in sinus rhythm on exam. Also follows with electrophysiology. -     apixaban (ELIQUIS) 5 MG TABS tablet; Take 1 tablet by mouth 2 times daily Take 1 tablet by mouth BID    Lupus ([address]  Would like to establish care with rheumatology locally. -     Carmelo Lee MD, Internal Medicine, [facility]    Rheumatoid arthritis with positive rheumatoid factor, involving unspecified site [facility])  As above. -     Carmelo Lee MD, Internal Medicine, [facility]        Follow up:  3 months for anxiety    Patient agrees with the above stated plan. Jessica Umanzor received counseling on the following healthy behaviors: nutrition, exercise and medication adherence.     Josie Zendejas MD  PGY-3 Family Medicine

## 2021-08-11 NOTE — PROGRESS NOTES
Follow up of anxiety  Also fell on shovel, complains of pain  Examination  Blood pressure 102/60, pulse 54, temperature 98 °F (36.7 °C), resp. rate 18, height 5' 7\" (1.702 m), weight 166 lb 9.6 oz (75.6 kg), SpO2 98 %. no deformity or crepitus  Shoulders normal ROM  Anxiety stable  A/P  Contusion of chest wall  Attending Physician Statement  I have discussed the case, including pertinent history and exam findings with the resident. I agree with the documented assessment and plan.

## 2021-08-17 DIAGNOSIS — F32.A ANXIETY AND DEPRESSION: ICD-10-CM

## 2021-08-17 DIAGNOSIS — F41.9 ANXIETY AND DEPRESSION: ICD-10-CM

## 2021-08-18 RX ORDER — ESCITALOPRAM OXALATE 20 MG/1
TABLET ORAL
Qty: 30 TABLET | Refills: 2 | Status: SHIPPED
Start: 2021-08-18 | End: 2021-11-15 | Stop reason: SDUPTHER

## 2021-08-21 ASSESSMENT — ENCOUNTER SYMPTOMS
CONSTIPATION: 0
SHORTNESS OF BREATH: 0
DIARRHEA: 0
ABDOMINAL PAIN: 0
COUGH: 0
VOMITING: 0

## 2021-10-29 ENCOUNTER — TELEPHONE (OUTPATIENT)
Dept: ADMINISTRATIVE | Age: 61
End: 2021-10-29

## 2021-10-29 DIAGNOSIS — I48.91 ATRIAL FIBRILLATION, UNSPECIFIED TYPE (HCC): ICD-10-CM

## 2021-10-29 RX ORDER — METOPROLOL SUCCINATE 50 MG/1
TABLET, EXTENDED RELEASE ORAL
Qty: 45 TABLET | Refills: 2 | Status: SHIPPED
Start: 2021-10-29 | End: 2021-12-14 | Stop reason: DRUGHIGH

## 2021-10-29 NOTE — TELEPHONE ENCOUNTER
Pt calling she was due in May for a 6 week f/u with Dr. Lora Mark - Was cancelled - family emergency. No available apts until Jan.  Please call her with an appropriate apt. Also, she needs refills on her Eliquis 5 mg bid and metoprolol succinate 50 mg to [facility] in [place]. Thank you.

## 2021-11-11 ENCOUNTER — OFFICE VISIT (OUTPATIENT)
Dept: NON INVASIVE DIAGNOSTICS | Age: 61
End: 2021-11-11
Payer: COMMERCIAL

## 2021-11-11 VITALS
HEIGHT: 67 IN | BODY MASS INDEX: 24.14 KG/M2 | WEIGHT: 153.8 LBS | RESPIRATION RATE: 18 BRPM | HEART RATE: 43 BPM | SYSTOLIC BLOOD PRESSURE: 130 MMHG | DIASTOLIC BLOOD PRESSURE: 80 MMHG

## 2021-11-11 DIAGNOSIS — I48.91 ATRIAL FIBRILLATION, UNSPECIFIED TYPE (HCC): Primary | ICD-10-CM

## 2021-11-11 DIAGNOSIS — Z86.73 OLD CEREBROVASCULAR ACCIDENT (CVA) WITHOUT LATE EFFECT: ICD-10-CM

## 2021-11-11 DIAGNOSIS — I48.0 PAF (PAROXYSMAL ATRIAL FIBRILLATION) (HCC): ICD-10-CM

## 2021-11-11 DIAGNOSIS — R94.31 EKG ABNORMALITIES: ICD-10-CM

## 2021-11-11 DIAGNOSIS — R00.2 PALPITATIONS: ICD-10-CM

## 2021-11-11 PROCEDURE — 4004F PT TOBACCO SCREEN RCVD TLK: CPT | Performed by: INTERNAL MEDICINE

## 2021-11-11 PROCEDURE — 93000 ELECTROCARDIOGRAM COMPLETE: CPT | Performed by: INTERNAL MEDICINE

## 2021-11-11 PROCEDURE — 3017F COLORECTAL CA SCREEN DOC REV: CPT | Performed by: INTERNAL MEDICINE

## 2021-11-11 PROCEDURE — G8420 CALC BMI NORM PARAMETERS: HCPCS | Performed by: INTERNAL MEDICINE

## 2021-11-11 PROCEDURE — G8427 DOCREV CUR MEDS BY ELIG CLIN: HCPCS | Performed by: INTERNAL MEDICINE

## 2021-11-11 PROCEDURE — 99215 OFFICE O/P EST HI 40 MIN: CPT | Performed by: INTERNAL MEDICINE

## 2021-11-11 PROCEDURE — G8484 FLU IMMUNIZE NO ADMIN: HCPCS | Performed by: INTERNAL MEDICINE

## 2021-11-11 RX ORDER — HYDROCHLOROTHIAZIDE 12.5 MG/1
CAPSULE, GELATIN COATED ORAL
COMMUNITY
Start: 2021-11-05

## 2021-11-11 ASSESSMENT — ENCOUNTER SYMPTOMS
EYE REDNESS: 0
COUGH: 0
DIARRHEA: 0
COLOR CHANGE: 0
NAUSEA: 0
SINUS PRESSURE: 0
CHEST TIGHTNESS: 0
ABDOMINAL DISTENTION: 0
SHORTNESS OF BREATH: 0
WHEEZING: 0
ABDOMINAL PAIN: 0

## 2021-11-11 NOTE — PROGRESS NOTES
65's    Social History :  with 6 kids, 13 grand kids  Patient Active Problem List    Diagnosis Date Noted    Atrial fibrillation ([facility] 03/15/2021    Fried's esophagus with dysplasia 03/15/2021    Rheumatoid arthritis with positive rheumatoid factor ([facility] 03/15/2021    Lupus ([facility] 03/15/2021    Remote history of stroke 03/15/2021    Hx of deep venous thrombosis 03/15/2021    Anxiety and depression 03/15/2021       Family History   Problem Relation Age of Onset    Stroke Mother         minor    Colon Polyps Mother         precanceous    Heart Attack Father 58        ortic aneurism as well    Colon Cancer Brother 40    Deep Vein Thrombosis Brother         And PE    Stroke Brother     Colon Cancer Paternal Grandmother 80    Colon Cancer Brother 39    Deep Vein Thrombosis Brother     Seizures Brother        SOCIAL HISTORY   Social History     Socioeconomic History    Marital status:      Spouse name: Not on file    Number of children: Not on file    Years of education: Not on file    Highest education level: Not on file   Occupational History    Not on file   Tobacco Use    Smoking status: Current Every Day Smoker     Packs/day: 0.25     Years: 40.00     Pack years: 10.00     Start date: 1980    Smokeless tobacco: Never Used   Vaping Use    Vaping Use: Never used   Substance and Sexual Activity    Alcohol use: Yes     Comment: very rarely    Drug use: Not Currently    Sexual activity: Not on file   Other Topics Concern    Not on file   Social History Narrative    Not on file     Social Determinants of Health     Financial Resource Strain:     Difficulty of Paying Living Expenses: Not on file   Food Insecurity:     Worried About Running Out of Food in the Last Year: Not on file    Clarisse of Food in the Last Year: Not on file   Transportation Needs:     Lack of Transportation (Medical): Not on file    Lack of Transportation (Non-Medical):  Not on file   Physical Activity:  Days of Exercise per Week: Not on file    Minutes of Exercise per Session: Not on file   Stress:     Feeling of Stress : Not on file   Social Connections:     Frequency of Communication with Friends and Family: Not on file    Frequency of Social Gatherings with Friends and Family: Not on file    Attends [employer] Services: Not on file    Active Member of [address] [place] [address] or Organizations: Not on file    Attends Club or Organization Meetings: Not on file    Marital Status: Not on file   Intimate Partner Violence:     Fear of Current or Ex-Partner: Not on file    Emotionally Abused: Not on file    Physically Abused: Not on file    Sexually Abused: Not on file   Housing Stability:     Unable to Pay for Housing in the Last Year: Not on file    Number of [place] in the Last Year: Not on file    Unstable Housing in the Last Year: Not on file        History reviewed. No pertinent surgical history. Current Outpatient Medications   Medication Sig Dispense Refill    hydroCHLOROthiazide (MICROZIDE) 12.5 MG capsule TAKE 1 CAPSULE BY MOUTH EVERY DAY      FORTEO 620 MCG/2.48ML SOPN injection Inject 20 mcg into the skin daily       apixaban (ELIQUIS) 5 MG TABS tablet Take 1 tablet by mouth 2 times daily Take 1 tablet by mouth BID 60 tablet 2    metoprolol succinate (TOPROL XL) 50 MG extended release tablet Take 1 tablet by mouth every morning AND 0.5 tablets Daily with supper. Indications:  Take 1 tab in AM, Take half tab in PM. 45 tablet 2    escitalopram (LEXAPRO) 20 MG tablet TAKE 1 TABLET BY MOUTH EVERY MORNING 30 tablet 2    famotidine (PEPCID) 40 MG tablet Take 1 tablet by mouth every evening 30 tablet 3    Cholecalciferol (VITAMIN D3) 50 MCG (2000 UT) CAPS Take 2,000 Units by mouth      folic acid (FOLVITE) 1 MG tablet Take 1 mg by mouth daily      traZODone (DESYREL) 50 MG tablet Take 50 mg by mouth nightly      omeprazole (PRILOSEC) 40 MG delayed release capsule Take 40 mg by mouth daily Take 1 tablet by mouth daily      hydroxychloroquine (PLAQUENIL) 200 MG tablet Take 200 mg by mouth 2 times daily Take 1 tablet by mouth twice a day      methotrexate (RHEUMATREX) 2.5 MG chemo tablet Take 2.5 mg by mouth once a week Take 6 tablets by mouth weekly       No current facility-administered medications for this visit. Allergies   Allergen Reactions    Morphine Other (See Comments)     Rapid drop in BP     Phenergan [Promethazine]     Ativan [Lorazepam] Anxiety    Penicillins Rash           ROS:   Review of Systems   Constitutional: Negative for fatigue and fever. HENT: Negative for congestion, nosebleeds and sinus pressure. Eyes: Negative for redness and visual disturbance. Respiratory: Negative for cough, chest tightness, shortness of breath and wheezing. Cardiovascular: Positive for palpitations. Negative for chest pain and leg swelling. Gastrointestinal: Negative for abdominal distention, abdominal pain, diarrhea and nausea. Endocrine: Negative for cold intolerance, heat intolerance, polydipsia and polyphagia. Genitourinary: Negative for difficulty urinating, frequency and urgency. Musculoskeletal: Negative for arthralgias, joint swelling and myalgias. Skin: Negative for color change and wound. Neurological: Negative for dizziness, syncope, weakness and numbness. Psychiatric/Behavioral: Negative for agitation, behavioral problems, confusion, decreased concentration, hallucinations and suicidal ideas. The patient is not nervous/anxious. PHYSICAL EXAM:  Vitals:    11/11/21 1256   BP: 130/80   Pulse: (!) 43   Resp: 18   Weight: 153 lb 12.8 oz (69.8 kg)   Height: 5' 7\" (1.702 m)     Physical Exam  Vitals reviewed. Constitutional:       Appearance: Normal appearance. HENT:      Head: Normocephalic. Mouth/Throat:      Mouth: Mucous membranes are moist.      Pharynx: Oropharynx is clear.    Eyes:      Conjunctiva/sclera: Conjunctivae normal.   Neck: Vascular: No carotid bruit. Cardiovascular:      Rate and Rhythm: Normal rate and regular rhythm. Pulses: Normal pulses. Heart sounds: Normal heart sounds. Pulmonary:      Effort: Pulmonary effort is normal.      Breath sounds: Normal breath sounds. No rales. Chest:      Chest wall: No tenderness. Abdominal:      General: Bowel sounds are normal.      Palpations: Abdomen is soft. Musculoskeletal:         General: Normal range of motion. Cervical back: Normal range of motion and neck supple. Skin:     General: Skin is warm. Neurological:      General: No focal deficit present. Mental Status: She is alert and oriented to person, place, and time. Psychiatric:         Mood and Affect: Mood normal.         Behavior: Behavior normal.         Thought Content:  Thought content normal.          Pertinent Labs:  CBC:   WBC (E9/L)   Date Value   03/09/2021 5.5     Hemoglobin (g/dL)   Date Value   03/09/2021 14.0     Hematocrit (%)   Date Value   03/09/2021 42.7     Platelets (G3/P)   Date Value   03/09/2021 158      BMP:   Sodium (mmol/L)   Date Value   03/09/2021 138     Potassium (mmol/L)   Date Value   03/09/2021 4.0     Chloride (mmol/L)   Date Value   03/09/2021 102     CO2 (mmol/L)   Date Value   03/09/2021 29     BUN (mg/dL)   Date Value   03/09/2021 18     CREATININE (mg/dL)   Date Value   03/09/2021 0.9     Glucose (mg/dL)   Date Value   03/09/2021 96     Calcium (mg/dL)   Date Value   03/09/2021 9.3        TSH:   TSH (uIU/mL)   Date Value   03/09/2021 2.150      Lipid Profile:   Triglycerides (mg/dL)   Date Value   03/09/2021 125     HDL (mg/dL)   Date Value   03/09/2021 40     LDL Calculated (mg/dL)   Date Value   03/09/2021 143 (H)     Cholesterol, Total (mg/dL)   Date Value   03/09/2021 208 (H)      Hemoglobin A1C:   Hemoglobin A1C (%)   Date Value   08/11/2021 5.9           Pertinent Cardiac Testing:     ECG 11/11/2021: sinus bradycardia, rate: 43 bpm - see scanned esophagus  - On omeprazole and following with general surgery    6. Anxiety/ depression  - Currently on Lexapro    7. Tobacco abuse  - reports using 1 pack a day  - encouraged cessation. Plan  1. Will consider restarting Amiodarone 200mg daily after wearing the monitor. 2. Obtain a 14 day Zio to determine any arrhythmias. 3. Will obtain a TTE for updated structure and function. 4. Follow up in 3 months or sooner PRN. Encouraged the patient to call the office for any questions or concerns. I have spent a total of 40 minutes with the patient and his/her family reviewing the above stated recommendations. A total of >50% of that time involved face-to-face time providing counseling and or coordination of care with the other providers. Thank you for allowing me to participate in your patient's care.     Lyn Hill MD  Cardiac Electrophysiology  [address] [place] [address]

## 2021-11-11 NOTE — PROGRESS NOTES
Patient was in today had 14 day Zio XT placed per Dr Shivam Regan. Patient given instructions and questions answered, patient verbalized understanding.   Monitor # [id number]      Prince Ze MA

## 2021-11-15 ENCOUNTER — OFFICE VISIT (OUTPATIENT)
Dept: FAMILY MEDICINE CLINIC | Age: 61
End: 2021-11-15
Payer: MEDICAID

## 2021-11-15 VITALS
HEIGHT: 67 IN | DIASTOLIC BLOOD PRESSURE: 63 MMHG | HEART RATE: 61 BPM | TEMPERATURE: 97 F | WEIGHT: 158 LBS | OXYGEN SATURATION: 98 % | SYSTOLIC BLOOD PRESSURE: 109 MMHG | BODY MASS INDEX: 24.8 KG/M2 | RESPIRATION RATE: 16 BRPM

## 2021-11-15 DIAGNOSIS — F41.9 ANXIETY AND DEPRESSION: Primary | ICD-10-CM

## 2021-11-15 DIAGNOSIS — R82.994 HYPERCALCIURIA: ICD-10-CM

## 2021-11-15 DIAGNOSIS — F32.A ANXIETY AND DEPRESSION: Primary | ICD-10-CM

## 2021-11-15 PROCEDURE — G8484 FLU IMMUNIZE NO ADMIN: HCPCS | Performed by: FAMILY MEDICINE

## 2021-11-15 PROCEDURE — G8427 DOCREV CUR MEDS BY ELIG CLIN: HCPCS | Performed by: FAMILY MEDICINE

## 2021-11-15 PROCEDURE — 3017F COLORECTAL CA SCREEN DOC REV: CPT | Performed by: FAMILY MEDICINE

## 2021-11-15 PROCEDURE — 99213 OFFICE O/P EST LOW 20 MIN: CPT | Performed by: FAMILY MEDICINE

## 2021-11-15 PROCEDURE — 4004F PT TOBACCO SCREEN RCVD TLK: CPT | Performed by: FAMILY MEDICINE

## 2021-11-15 PROCEDURE — G8420 CALC BMI NORM PARAMETERS: HCPCS | Performed by: FAMILY MEDICINE

## 2021-11-15 RX ORDER — ESCITALOPRAM OXALATE 20 MG/1
TABLET ORAL
Qty: 30 TABLET | Refills: 2 | Status: SHIPPED
Start: 2021-11-15 | End: 2022-02-16 | Stop reason: SDUPTHER

## 2021-11-15 SDOH — ECONOMIC STABILITY: FOOD INSECURITY: WITHIN THE PAST 12 MONTHS, YOU WORRIED THAT YOUR FOOD WOULD RUN OUT BEFORE YOU GOT MONEY TO BUY MORE.: NEVER TRUE

## 2021-11-15 SDOH — ECONOMIC STABILITY: FOOD INSECURITY: WITHIN THE PAST 12 MONTHS, THE FOOD YOU BOUGHT JUST DIDN'T LAST AND YOU DIDN'T HAVE MONEY TO GET MORE.: NEVER TRUE

## 2021-11-15 ASSESSMENT — SOCIAL DETERMINANTS OF HEALTH (SDOH): HOW HARD IS IT FOR YOU TO PAY FOR THE VERY BASICS LIKE FOOD, HOUSING, MEDICAL CARE, AND HEATING?: NOT HARD AT ALL

## 2021-11-15 NOTE — PROGRESS NOTES
[facility]  Precepting Note    Subjective:  F/u of Anxiety  Doing well  Compliant with meds    Following with rheumatology for Lupus       ROS otherwise negative     Past medical, surgical, family and social history were reviewed, non-contributory, and unchanged unless otherwise stated. Objective:    /63   Pulse 61   Temp 97 °F (36.1 °C) (Temporal)   Resp 16   Ht 5' 7\" (1.702 m)   Wt 158 lb (71.7 kg)   SpO2 98%   Breastfeeding No   BMI 24.75 kg/m²     Exam is as noted by resident with the following changes, additions or corrections:    General:  NAD; alert & oriented x 3   Heart:  RRR, no murmurs, gallops, or rubs. Lungs:  CTA bilaterally, no wheeze, rales or rhonchi  Abd: bowel sounds present, nontender, nondistended, no masses  Extrem:  No clubbing, cyanosis, or edema    Assessment/Plan:  Anxiety  Continue the same    Lupus: follow with rheumatology  Check BMP       Attending Physician Statement  I have reviewed the chart, including any radiology or labs. I have discussed the case, including pertinent history and exam findings with the resident. I agree with the assessment, plan and orders as documented by the resident. Please refer to the resident note for additional information.       Electronically signed by Leida Cao MD on 11/15/2021 at 4:20 PM

## 2021-11-15 NOTE — PROGRESS NOTES
11/15/2021    Erich Wisdom is a 63 y.o. femalehere for:    HPI:  CC- anxiety and depression follow up  Taking lexapro 10 mg   Trazodone helps too  Not so depressed or anxious   Sleeping well  No SI/HI    Of note, she was started on 12.5 mg hydrochlorothiazide from rheumatology that she follows up with for lupus, for the hypercalciuria. BP Readings from Last 3 Encounters:   11/15/21 109/63   11/11/21 130/80   08/11/21 102/60     Current Outpatient Medications   Medication Sig Dispense Refill    hydroCHLOROthiazide (MICROZIDE) 12.5 MG capsule TAKE 1 CAPSULE BY MOUTH EVERY DAY      FORTEO 620 MCG/2.48ML SOPN injection Inject 20 mcg into the skin daily       apixaban (ELIQUIS) 5 MG TABS tablet Take 1 tablet by mouth 2 times daily Take 1 tablet by mouth BID 60 tablet 2    metoprolol succinate (TOPROL XL) 50 MG extended release tablet Take 1 tablet by mouth every morning AND 0.5 tablets Daily with supper. Indications: Take 1 tab in AM, Take half tab in PM. 45 tablet 2    escitalopram (LEXAPRO) 20 MG tablet TAKE 1 TABLET BY MOUTH EVERY MORNING 30 tablet 2    famotidine (PEPCID) 40 MG tablet Take 1 tablet by mouth every evening 30 tablet 3    Cholecalciferol (VITAMIN D3) 50 MCG (2000 UT) CAPS Take 2,000 Units by mouth      folic acid (FOLVITE) 1 MG tablet Take 1 mg by mouth daily      traZODone (DESYREL) 50 MG tablet Take 50 mg by mouth nightly      omeprazole (PRILOSEC) 40 MG delayed release capsule Take 40 mg by mouth daily Take 1 tablet by mouth daily      hydroxychloroquine (PLAQUENIL) 200 MG tablet Take 200 mg by mouth 2 times daily Take 1 tablet by mouth twice a day      methotrexate (RHEUMATREX) 2.5 MG chemo tablet Take 2.5 mg by mouth once a week Take 6 tablets by mouth weekly       No current facility-administered medications for this visit.       Allergies   Allergen Reactions    Morphine Anaphylaxis     Rapid drop in BP     Phenergan [Promethazine] Other (See Comments)     Increased anxity    Ativan [Lorazepam] Anxiety    Penicillins Rash       Past Medical & Surgical History:      Diagnosis Date    Anxiety and depression     Atrial fibrillation ([address]     Fried's esophagus with dysplasia     COVID-19 04/04/2021    Hx of deep venous thrombosis     [facility]     Stroke ([facility]      No past surgical history on file. Family History:      Problem Relation Age of Onset    Stroke Mother         minor    Colon Polyps Mother         precanceous    Heart Attack Father 58        ortic aneurism as well    Colon Cancer Brother 40    Deep Vein Thrombosis Brother         And PE    Stroke Brother     Colon Cancer Paternal Grandmother 80    Colon Cancer Brother 39    Deep Vein Thrombosis Brother     Seizures Brother        Social History:  Social History     Tobacco Use    Smoking status: Current Every Day Smoker     Packs/day: 0.25     Years: 40.00     Pack years: 10.00     Start date: 1980    Smokeless tobacco: Never Used   Substance Use Topics    Alcohol use: Yes     Comment: very rarely       Immunization History   Administered Date(s) Administered    Influenza, Quadv, IM, PF (6 mo and older Fluzone, Flulaval, Fluarix, and 3 yrs and older Afluria) 03/09/2021       Review of Systems   Constitutional: Negative for chills and fever. HENT: Negative for congestion and rhinorrhea. Eyes: Negative for visual disturbance. Respiratory: Negative for cough and shortness of breath. Cardiovascular: Negative for chest pain and leg swelling. Gastrointestinal: Negative for abdominal pain, diarrhea and vomiting. Genitourinary: Negative for dysuria and hematuria. Musculoskeletal: Positive for arthralgias. Skin: Negative for rash. Neurological: Negative for weakness and numbness. Psychiatric/Behavioral: Negative for dysphoric mood, sleep disturbance and suicidal ideas. The patient is not nervous/anxious.         VS:  /63   Pulse 61   Temp 97 °F (36.1 °C) (Temporal) Resp 16   Ht 5' 7\" (1.702 m)   Wt 158 lb (71.7 kg)   SpO2 98%   Breastfeeding No   BMI 24.75 kg/m²     Physical Exam  Vitals reviewed. Constitutional:       General: She is not in acute distress. Appearance: Normal appearance. She is not ill-appearing. HENT:      Head: Normocephalic and atraumatic. Mouth/Throat:      Mouth: Mucous membranes are moist.   Eyes:      General: No scleral icterus. Conjunctiva/sclera: Conjunctivae normal.   Cardiovascular:      Rate and Rhythm: Normal rate. Rhythm irregular. Heart sounds: Normal heart sounds. No murmur heard. Pulmonary:      Effort: Pulmonary effort is normal. No respiratory distress. Breath sounds: Normal breath sounds. No wheezing or rhonchi. Abdominal:      General: Abdomen is flat. There is no distension. Palpations: Abdomen is soft. Tenderness: There is no abdominal tenderness. Musculoskeletal:         General: Normal range of motion. Cervical back: Normal range of motion and neck supple. No rigidity. Right lower leg: No edema. Left lower leg: No edema. Skin:     General: Skin is warm. Findings: No rash. Neurological:      General: No focal deficit present. Mental Status: She is alert and oriented to person, place, and time. Psychiatric:         Mood and Affect: Mood normal.         Behavior: Behavior normal.         Assessment/Plan:  Preet Fields was seen today for anxiety, lupus and health maintenance. Diagnoses and all orders for this visit:    Anxiety and depression  Well controlled. Continue same.  -     escitalopram (LEXAPRO) 20 MG tablet; TAKE 1 TABLET BY MOUTH EVERY MORNING    Hypercalciuria  Recheck BMP since being started on Hydrochlorothiazide for the hypercalciuria found from rheumatology.  -     BASIC METABOLIC PANEL; Future      Follow up:  3 months for afib, anxiety     Patient agrees with the above stated plan.     Theo Will MD  PGY-3 Family Medicine

## 2021-11-18 ENCOUNTER — TELEPHONE (OUTPATIENT)
Dept: CARDIOLOGY | Age: 61
End: 2021-11-18

## 2021-11-21 ASSESSMENT — ENCOUNTER SYMPTOMS
COUGH: 0
RHINORRHEA: 0
DIARRHEA: 0
SHORTNESS OF BREATH: 0
VOMITING: 0
ABDOMINAL PAIN: 0

## 2021-11-30 ENCOUNTER — HOSPITAL ENCOUNTER (OUTPATIENT)
Age: 61
Discharge: HOME | End: 2021-11-30
Payer: MEDICAID

## 2021-11-30 DIAGNOSIS — R82.994 HYPERCALCIURIA: ICD-10-CM

## 2021-11-30 LAB
ANION GAP SERPL CALCULATED.3IONS-SCNC: 10 MMOL/L (ref 7–16)
BUN BLDV-MCNC: 20 MG/DL (ref 6–20)
CALCIUM SERPL-MCNC: 9.2 MG/DL (ref 8.6–10.2)
CHLORIDE BLD-SCNC: 100 MMOL/L (ref 98–107)
CO2: 27 MMOL/L (ref 22–29)
CREAT SERPL-MCNC: 0.8 MG/DL (ref 0.5–1)
GFR AFRICAN AMERICAN: >60
GFR NON-AFRICAN AMERICAN: >60 ML/MIN/1.73
GLUCOSE BLD-MCNC: 89 MG/DL (ref 74–99)
POTASSIUM SERPL-SCNC: 4.1 MMOL/L (ref 3.5–5)
SODIUM BLD-SCNC: 137 MMOL/L (ref 132–146)

## 2021-11-30 PROCEDURE — 36415 COLL VENOUS BLD VENIPUNCTURE: CPT

## 2021-11-30 PROCEDURE — 80048 BASIC METABOLIC PNL TOTAL CA: CPT

## 2021-12-13 DIAGNOSIS — I48.91 ATRIAL FIBRILLATION, UNSPECIFIED TYPE (HCC): ICD-10-CM

## 2021-12-14 ENCOUNTER — TELEPHONE (OUTPATIENT)
Dept: NON INVASIVE DIAGNOSTICS | Age: 61
End: 2021-12-14

## 2021-12-14 RX ORDER — METOPROLOL SUCCINATE 50 MG/1
50 TABLET, EXTENDED RELEASE ORAL 2 TIMES DAILY
COMMUNITY
End: 2022-02-03

## 2021-12-14 NOTE — TELEPHONE ENCOUNTER
Spoke with patient let her know results of monitor and medication recommendations. She verbalized understanding.

## 2021-12-21 ENCOUNTER — TELEPHONE (OUTPATIENT)
Dept: CARDIOLOGY | Age: 61
End: 2021-12-21

## 2021-12-21 NOTE — TELEPHONE ENCOUNTER
CALLED PATIENT TO RESCHEDULE ECHO THAT WAS SCHEDULED FOR THIS MORNING. PATIENT WAS A NO SHOW. . RESCHEDULED FOR 01-17-22    Electronically signed by Geovanny Olsen on 12/21/2021 at 9:44 AM

## 2022-01-14 ENCOUNTER — TELEPHONE (OUTPATIENT)
Dept: CARDIOLOGY | Age: 62
End: 2022-01-14

## 2022-01-14 NOTE — TELEPHONE ENCOUNTER
PATIENT CALLED TO RESCHEDULE ECHO THAT WAS SCHEDULED FOR 01-17-22. [place] IS [place].  RESCHEDULED FOR 01-25-22    Electronically signed by Mook Sears on 1/14/2022 at 10:45 AM

## 2022-01-24 ENCOUNTER — TELEPHONE (OUTPATIENT)
Dept: CARDIOLOGY | Age: 62
End: 2022-01-24

## 2022-01-25 ENCOUNTER — HOSPITAL ENCOUNTER (OUTPATIENT)
Dept: CARDIOLOGY | Age: 62
Discharge: HOME | End: 2022-01-25
Payer: COMMERCIAL

## 2022-01-25 DIAGNOSIS — R94.31 EKG ABNORMALITIES: ICD-10-CM

## 2022-01-25 LAB
LV EF: 43 %
LVEF MODALITY: NORMAL

## 2022-01-25 PROCEDURE — 93306 TTE W/DOPPLER COMPLETE: CPT

## 2022-01-31 ENCOUNTER — TELEPHONE (OUTPATIENT)
Dept: NON INVASIVE DIAGNOSTICS | Age: 62
End: 2022-01-31

## 2022-01-31 NOTE — TELEPHONE ENCOUNTER
----- Message from Doe Mathews MD sent at 1/28/2022 12:39 PM EST -----  Pls let her know that her LVEF is mildly reduced.  Has she had prior ECHO or stress test? We would need to do stress test if none on file

## 2022-01-31 NOTE — TELEPHONE ENCOUNTER
Patient's phone number is no longer valid. I called her husbands phone, no answer and his VM box is full. I will mail a letter asking her to call for test results.

## 2022-02-03 DIAGNOSIS — I48.91 ATRIAL FIBRILLATION, UNSPECIFIED TYPE (HCC): ICD-10-CM

## 2022-02-03 RX ORDER — APIXABAN 5 MG/1
TABLET, FILM COATED ORAL
Qty: 60 TABLET | Refills: 2 | Status: SHIPPED
Start: 2022-02-03 | End: 2022-08-25 | Stop reason: SDUPTHER

## 2022-02-03 RX ORDER — METOPROLOL SUCCINATE 50 MG/1
TABLET, EXTENDED RELEASE ORAL
Qty: 30 TABLET | Refills: 11 | Status: SHIPPED
Start: 2022-02-03 | End: 2022-02-09 | Stop reason: SINTOL

## 2022-02-03 RX ORDER — METOPROLOL SUCCINATE 25 MG/1
TABLET, EXTENDED RELEASE ORAL
Qty: 30 TABLET | Refills: 11 | Status: SHIPPED | OUTPATIENT
Start: 2022-02-03

## 2022-02-07 DIAGNOSIS — R94.31 ABNORMAL EKG: Primary | ICD-10-CM

## 2022-02-09 ENCOUNTER — TELEPHONE (OUTPATIENT)
Dept: CARDIOLOGY | Age: 62
End: 2022-02-09

## 2022-02-09 ENCOUNTER — TELEPHONE (OUTPATIENT)
Dept: NON INVASIVE DIAGNOSTICS | Age: 62
End: 2022-02-09

## 2022-02-09 NOTE — TELEPHONE ENCOUNTER
----- Message from Jaaj Borja MD sent at 2/9/2022  9:06 AM EST -----  Decrease metoprolol to 25 mg qd and see if it helps with symptoms  ----- Message -----  From: Ronn Jimenez  Sent: 2/7/2022   4:29 PM EST  To: Jaja Borja MD    Patient called in stating she is very dizzy and having low blood pressure, she believes it is from the Metoprolol. Patient would like to know if she can reduce her dose? She takes 25 mg in the evening and 50 mg in morning. Please advise, thank you.

## 2022-02-16 ENCOUNTER — OFFICE VISIT (OUTPATIENT)
Dept: FAMILY MEDICINE CLINIC | Age: 62
End: 2022-02-16
Payer: COMMERCIAL

## 2022-02-16 VITALS
HEIGHT: 67 IN | BODY MASS INDEX: 24.96 KG/M2 | RESPIRATION RATE: 16 BRPM | DIASTOLIC BLOOD PRESSURE: 79 MMHG | OXYGEN SATURATION: 97 % | TEMPERATURE: 98.1 F | WEIGHT: 159 LBS | HEART RATE: 78 BPM | SYSTOLIC BLOOD PRESSURE: 129 MMHG

## 2022-02-16 DIAGNOSIS — F41.9 ANXIETY AND DEPRESSION: Primary | ICD-10-CM

## 2022-02-16 DIAGNOSIS — I50.20 HFREF (HEART FAILURE WITH REDUCED EJECTION FRACTION) (HCC): ICD-10-CM

## 2022-02-16 DIAGNOSIS — F32.A ANXIETY AND DEPRESSION: Primary | ICD-10-CM

## 2022-02-16 DIAGNOSIS — I48.0 PAF (PAROXYSMAL ATRIAL FIBRILLATION) (HCC): ICD-10-CM

## 2022-02-16 PROCEDURE — 4004F PT TOBACCO SCREEN RCVD TLK: CPT | Performed by: FAMILY MEDICINE

## 2022-02-16 PROCEDURE — G8427 DOCREV CUR MEDS BY ELIG CLIN: HCPCS | Performed by: FAMILY MEDICINE

## 2022-02-16 PROCEDURE — 99213 OFFICE O/P EST LOW 20 MIN: CPT | Performed by: FAMILY MEDICINE

## 2022-02-16 PROCEDURE — G8484 FLU IMMUNIZE NO ADMIN: HCPCS | Performed by: FAMILY MEDICINE

## 2022-02-16 PROCEDURE — 3017F COLORECTAL CA SCREEN DOC REV: CPT | Performed by: FAMILY MEDICINE

## 2022-02-16 PROCEDURE — G8420 CALC BMI NORM PARAMETERS: HCPCS | Performed by: FAMILY MEDICINE

## 2022-02-16 RX ORDER — ESCITALOPRAM OXALATE 20 MG/1
TABLET ORAL
Qty: 30 TABLET | Refills: 2 | Status: SHIPPED
Start: 2022-02-16 | End: 2022-06-19

## 2022-02-16 RX ORDER — TRAZODONE HYDROCHLORIDE 50 MG/1
50 TABLET ORAL NIGHTLY
Qty: 30 TABLET | Refills: 1 | Status: SHIPPED
Start: 2022-02-16 | End: 2022-09-20

## 2022-02-16 NOTE — PROGRESS NOTES
2/16/2022    Cleve Tinoco is a 63 y.o. femalehere for:    HPI:  CC- anxiety, nausea and confused after being started on some new meds  Anxiety  Taking lexapro 10 mg   Trazodone helps too  No SI/HI    Does not feel well since being started on new meds from rheumatology and cardiology : teriparatide from rheum,  toprol increased iniitially to 50 mg , then went back to 25 from cardiology    Has not been checking the blood pressure  Very lightheaded constasntly, nausea, palpitations when lightheadeness is the worst    No new numbness or weakness.  Does have hx of CVA years ago with residual memory issues     BP Readings from Last 3 Encounters:   02/16/22 129/79   11/15/21 109/63   11/11/21 130/80     Current Outpatient Medications   Medication Sig Dispense Refill    ELIQUIS 5 MG TABS tablet TAKE 1 TABLET BY MOUTH TWICE DAILY 60 tablet 2    metoprolol succinate (TOPROL XL) 25 MG extended release tablet TAKE 1 TABLET BY MOUTH EVERY EVENING WITH DINNER 30 tablet 11    escitalopram (LEXAPRO) 20 MG tablet TAKE 1 TABLET BY MOUTH EVERY MORNING 30 tablet 2    hydroCHLOROthiazide (MICROZIDE) 12.5 MG capsule TAKE 1 CAPSULE BY MOUTH EVERY DAY      FORTEO 620 MCG/2.48ML SOPN injection Inject 20 mcg into the skin daily       famotidine (PEPCID) 40 MG tablet Take 1 tablet by mouth every evening 30 tablet 3    Cholecalciferol (VITAMIN D3) 50 MCG (2000 UT) CAPS Take 2,000 Units by mouth      folic acid (FOLVITE) 1 MG tablet Take 1 mg by mouth daily      traZODone (DESYREL) 50 MG tablet Take 50 mg by mouth nightly      omeprazole (PRILOSEC) 40 MG delayed release capsule Take 40 mg by mouth daily Take 1 tablet by mouth daily      hydroxychloroquine (PLAQUENIL) 200 MG tablet Take 200 mg by mouth 2 times daily Take 1 tablet by mouth twice a day      methotrexate (RHEUMATREX) 2.5 MG chemo tablet Take 2.5 mg by mouth once a week Take 6 tablets by mouth weekly       No current facility-administered medications for this mood, sleep disturbance and suicidal ideas. The patient is not nervous/anxious. VS:  /79   Pulse 78   Temp 98.1 °F (36.7 °C) (Temporal)   Resp 16   Ht 5' 7\" (1.702 m)   Wt 159 lb (72.1 kg)   SpO2 97%   BMI 24.90 kg/m²     Physical Exam  Vitals reviewed. Constitutional:       General: She is not in acute distress. Appearance: Normal appearance. She is not ill-appearing. HENT:      Head: Normocephalic and atraumatic. Right Ear: External ear normal.      Left Ear: External ear normal.      Nose: Nose normal.      Mouth/Throat:      Mouth: Mucous membranes are moist.   Eyes:      General: No scleral icterus. Conjunctiva/sclera: Conjunctivae normal.   Cardiovascular:      Rate and Rhythm: Normal rate and regular rhythm. Heart sounds: Normal heart sounds. No murmur heard. Pulmonary:      Effort: Pulmonary effort is normal. No respiratory distress. Breath sounds: Normal breath sounds. No wheezing or rhonchi. Abdominal:      General: Abdomen is flat. There is no distension. Palpations: Abdomen is soft. Tenderness: There is no abdominal tenderness. Musculoskeletal:         General: Normal range of motion. Cervical back: Normal range of motion and neck supple. No rigidity. Right lower leg: No edema. Left lower leg: No edema. Skin:     General: Skin is warm. Findings: No rash. Neurological:      General: No focal deficit present. Mental Status: She is alert and oriented to person, place, and time. Psychiatric:         Mood and Affect: Mood is anxious. Behavior: Behavior normal.         Assessment/Plan:  Yonathan Santoro was seen today for anxiety, nausea, fatigue and altered mental status. Diagnoses and all orders for this visit:    Anxiety and depression  Well controlled. Continue same.  -     escitalopram (LEXAPRO) 20 MG tablet; TAKE 1 TABLET BY MOUTH EVERY MORNING  -     traZODone (DESYREL) 50 MG tablet;  Take 1 tablet by mouth

## 2022-02-16 NOTE — PROGRESS NOTES
Follow up of anxiety  Well controlled  A-fib and intermittent RVR  Follows with cardiologist  Examination  Blood pressure 129/79, pulse 78, temperature 98.1 °F (36.7 °C), temperature source Temporal, resp. rate 16, height 5' 7\" (1.702 m), weight 159 lb (72.1 kg), SpO2 97 %, not currently breastfeeding. Stable exam  Heart regular rhythm. Recent echo reviewed  A/P  Follow up with cardiologist  Attending Physician Statement  I have discussed the case, including pertinent history and exam findings with the resident. I agree with the documented assessment and plan.

## 2022-02-17 ENCOUNTER — TELEPHONE (OUTPATIENT)
Dept: ADMINISTRATIVE | Age: 62
End: 2022-02-17

## 2022-02-17 NOTE — TELEPHONE ENCOUNTER
Patient Appointment Form:      PCP: Dr. Marin Ortega  Referring: Dr. Marin Ortega    Has the Patient:    Seen a Cardiologist? yes    date:18 months ago  Physician:pt can't remember  location:[facility]    Had a heart catheterization? no    Had heart surgery? no    Had a stress test or nuclear stress test? other: Pt can't remember    Had an echocardiogram? yes   date: 1/25/22   facility name:  [facility]    Had a vascular ultrasound? no    Had a 24/48 heart monitor or extended cardiac event monitor? no    Had recent blood work in the last 6 months? yes    date: recent    ordering physician: Ny Villalpando    Had a pacemaker/ICD/ILR implant? no    Seen an Electrophysiologist? no        Will send records via: in [address] [place] [address] Rd      Date & time of appointment:  [email or web address]

## 2022-02-25 ENCOUNTER — TELEPHONE (OUTPATIENT)
Dept: CARDIOLOGY | Age: 62
End: 2022-02-25

## 2022-02-28 ENCOUNTER — TELEPHONE (OUTPATIENT)
Dept: CARDIOLOGY | Age: 62
End: 2022-02-28

## 2022-03-01 ENCOUNTER — HOSPITAL ENCOUNTER (OUTPATIENT)
Dept: CARDIOLOGY | Age: 62
Discharge: HOME | End: 2022-03-01
Payer: COMMERCIAL

## 2022-03-01 VITALS
SYSTOLIC BLOOD PRESSURE: 100 MMHG | WEIGHT: 158 LBS | RESPIRATION RATE: 20 BRPM | BODY MASS INDEX: 24.8 KG/M2 | HEIGHT: 67 IN | DIASTOLIC BLOOD PRESSURE: 64 MMHG | HEART RATE: 60 BPM

## 2022-03-01 DIAGNOSIS — R94.31 ABNORMAL EKG: ICD-10-CM

## 2022-03-01 PROCEDURE — 93017 CV STRESS TEST TRACING ONLY: CPT

## 2022-03-01 NOTE — PROCEDURES
[id number] [facility] and Vascular Lab - [place] [phone]    Exercise Stress Study (non-nuclear)      Name: Shilo MandujanoPerson Inge Account Number:  [de-identified]      :  1960          Sex: female         Date of Study:  3/1/2022    Height: 5' 7\" (170.2 cm)          Weight: 158 lb (71.7 kg)    Ordering Provider: Alessandro Snow MD          PCP: Blake Arreaga MD    Cardiologist: Alessandro Snow MD               Interpreting Physician: Vane Townsend MD    Indication:   Detecting the presence and location of coronary artery disease    Clinical History:   Patient has no known history of coronary artery disease. Exercise: The patient exercised using a Vince protocol, completing 8:17 minutes and reaching an estimated work load of 96.9 metabolic equivalents (METS). Resting HR was 74. Peak exercise heart rate was 112 ( 70% of maximum predicted heart rate for age). Baseline /64. Peak exercise /88. The blood pressure response to exercise was normal      Exercise was terminated due to right leg weakness. The patient experienced no chest pain with exercise. Exercise ECG:   The patient demonstrated occasional PVC's during exercise and recovery . No VT. With exercise, there were no ST segment changes of significance at the heart rate achieved. Impression:    1. Exercise EKG was negative. 2. The patient experienced no chest pain with exercise. 3. Exercise capacity was average. 4. Low risk general exercise treadmill test.    Thank you for sending your patient to this [employer].     Electronically signed by Vane Townsend MD on 3/1/22 at 9:40 AM EST

## 2022-03-04 ASSESSMENT — ENCOUNTER SYMPTOMS
VOMITING: 0
RHINORRHEA: 0
SHORTNESS OF BREATH: 0
DIARRHEA: 0
ABDOMINAL PAIN: 0
COUGH: 0

## 2022-03-10 ENCOUNTER — OFFICE VISIT (OUTPATIENT)
Dept: CARDIOLOGY CLINIC | Age: 62
End: 2022-03-10
Payer: COMMERCIAL

## 2022-03-10 VITALS
BODY MASS INDEX: 25.77 KG/M2 | SYSTOLIC BLOOD PRESSURE: 100 MMHG | WEIGHT: 164.2 LBS | HEART RATE: 51 BPM | HEIGHT: 67 IN | DIASTOLIC BLOOD PRESSURE: 80 MMHG | RESPIRATION RATE: 16 BRPM

## 2022-03-10 DIAGNOSIS — I48.91 ATRIAL FIBRILLATION, UNSPECIFIED TYPE (HCC): Primary | ICD-10-CM

## 2022-03-10 DIAGNOSIS — R07.9 CHEST PAIN, UNSPECIFIED TYPE: ICD-10-CM

## 2022-03-10 DIAGNOSIS — I42.9 CARDIOMYOPATHY, UNSPECIFIED TYPE (HCC): ICD-10-CM

## 2022-03-10 PROCEDURE — G8419 CALC BMI OUT NRM PARAM NOF/U: HCPCS | Performed by: INTERNAL MEDICINE

## 2022-03-10 PROCEDURE — 93000 ELECTROCARDIOGRAM COMPLETE: CPT | Performed by: INTERNAL MEDICINE

## 2022-03-10 PROCEDURE — 99244 OFF/OP CNSLTJ NEW/EST MOD 40: CPT | Performed by: INTERNAL MEDICINE

## 2022-03-10 PROCEDURE — G8484 FLU IMMUNIZE NO ADMIN: HCPCS | Performed by: INTERNAL MEDICINE

## 2022-03-10 PROCEDURE — G8427 DOCREV CUR MEDS BY ELIG CLIN: HCPCS | Performed by: INTERNAL MEDICINE

## 2022-03-10 NOTE — PROGRESS NOTES
OUTPATIENT CARDIOLOGY CONSULT    Name: Mecca Eckert    Age: 63 y.o. Date of Service: 3/10/2022    Reason for Consultation: Cardiomyopathy    Referring Physician: Dario Barroso MD    History of Present Illness: Mecca Eckert is a 63 y.o. female who presents today for further evaluation of LV dysfunction. Has history of PAF and CVA, follows [facility]. Moved to the area last year from [place]. Other history includes lupus, DVT, thrombophilia. Recent echo showed EF 40 to 45%. Echo personally reviewed, looks more like 45 to 50%. Had an exercise treadmill stress test recently, she only got to 70% of maximum predicted heart rate so the test was nondiagnostic. She notes intermittent palpitations, dizziness. She gets short of breath with exertion, with mild activity and sexual activity. She previously used to be able to walk briskly without problems. She also notes daily left-sided chest pressure, sometimes worse with walking sometimes worse with deep breathing. Sometimes a sharp pain occurs as well. Lasted several seconds to several minutes. Notices some radiation to the left arm sometimes to the back. Associate with nausea. Goes away on its own after a few minutes. Review of Systems:  Complete review of systems otherwise negative except as described above. Past Medical History:  Past Medical History:   Diagnosis Date    Anxiety and depression     Atrial fibrillation ([address]     Fried's esophagus with dysplasia     COVID-19 04/04/2021    Hx of deep venous thrombosis     [facility]     Stroke [facility])        Past Surgical History:  History reviewed. No pertinent surgical history.     Family History:  Family History   Problem Relation Age of Onset    Stroke Mother         minor    Colon Polyps Mother         precanceous    Heart Attack Father 58        ortic aneurism as well    Colon Cancer Brother 40    Deep Vein Thrombosis Brother         And PE    Stroke Brother    [place] Colon Cancer Paternal Grandmother 80    Colon Cancer Brother 39    Deep Vein Thrombosis Brother     Seizures Brother        Social History:  Social History     Tobacco Use    Smoking status: Current Every Day Smoker     Packs/day: 0.25     Years: 40.00     Pack years: 10.00     Types: Cigarettes     Start date: 1980    Smokeless tobacco: Never Used   Vaping Use    Vaping Use: Never used   Substance Use Topics    Alcohol use: Yes     Comment: very rarely    Drug use: Not Currently        Allergies:   Allergies   Allergen Reactions    Morphine Anaphylaxis     Rapid drop in BP     Phenergan [Promethazine] Other (See Comments)     Increased anxity    Ativan [Lorazepam] Anxiety    Penicillins Rash       Current Medications:    Current Outpatient Medications:     escitalopram (LEXAPRO) 20 MG tablet, TAKE 1 TABLET BY MOUTH EVERY MORNING, Disp: 30 tablet, Rfl: 2    traZODone (DESYREL) 50 MG tablet, Take 1 tablet by mouth nightly (Patient taking differently: Take 25 mg by mouth nightly ), Disp: 30 tablet, Rfl: 1    ELIQUIS 5 MG TABS tablet, TAKE 1 TABLET BY MOUTH TWICE DAILY, Disp: 60 tablet, Rfl: 2    metoprolol succinate (TOPROL XL) 25 MG extended release tablet, TAKE 1 TABLET BY MOUTH EVERY EVENING WITH DINNER (Patient taking differently: Take 25 mg by mouth in the morning and at bedtime ), Disp: 30 tablet, Rfl: 11    hydroCHLOROthiazide (MICROZIDE) 12.5 MG capsule, TAKE 1 CAPSULE BY MOUTH EVERY DAY, Disp: , Rfl:     FORTEO 620 MCG/2.48ML SOPN injection, Inject 20 mcg into the skin daily , Disp: , Rfl:     famotidine (PEPCID) 40 MG tablet, Take 1 tablet by mouth every evening, Disp: 30 tablet, Rfl: 3    Cholecalciferol (VITAMIN D3) 50 MCG (2000 UT) CAPS, Take 2,000 Units by mouth, Disp: , Rfl:     folic acid (FOLVITE) 1 MG tablet, Take 1 mg by mouth daily, Disp: , Rfl:     omeprazole (PRILOSEC) 40 MG delayed release capsule, Take 40 mg by mouth daily Take 1 tablet by mouth daily, Disp: , Rfl:    hydroxychloroquine (PLAQUENIL) 200 MG tablet, Take 200 mg by mouth 2 times daily Take 1 tablet by mouth twice a day, Disp: , Rfl:     methotrexate (RHEUMATREX) 2.5 MG chemo tablet, Take 2.5 mg by mouth once a week Take 6 tablets by mouth weekly, Disp: , Rfl:     Physical Exam:  /80   Pulse 51   Resp 16   Ht 5' 7\" (1.702 m)   Wt 164 lb 3.2 oz (74.5 kg)   BMI 25.72 kg/m²   Wt Readings from Last 3 Encounters:   03/10/22 164 lb 3.2 oz (74.5 kg)   03/01/22 158 lb (71.7 kg)   02/16/22 159 lb (72.1 kg)     Appearance: Awake, alert, no acute respiratory distress  Skin: Intact, no rash  Eyes: EOMI, no conjunctival erythema  ENMT: Moist mucous membranes. Neck: Supple, no elevated JVP, no carotid bruits  Lungs: Clear to auscultation bilaterally. No wheezes, rales, or rhonchi. Cardiac: Regular rhythm with a bradycardic rate.   S1 & S2 normal, no murmurs  Abdomen: Soft, nontender, +bowel sounds  Extremities: Moves all extremities x 4, no lower extremity edema  Neurologic: No focal motor deficits apparent, normal mood and affect  Peripheral Pulses: Intact posterior tibial pulses bilaterally    Laboratory Tests:  Lab Results   Component Value Date    CREATININE 0.8 11/30/2021    BUN 20 11/30/2021     11/30/2021    K 4.1 11/30/2021     11/30/2021    CO2 27 11/30/2021     No results found for: MG  Lab Results   Component Value Date    WBC 5.5 03/09/2021    HGB 14.0 03/09/2021    HCT 42.7 03/09/2021    MCV 92.0 03/09/2021     03/09/2021     Lab Results   Component Value Date    ALT 7 03/09/2021    AST 11 03/09/2021    ALKPHOS 51 03/09/2021    BILITOT 0.3 03/09/2021     No results found for: CKTOTAL, CKMB, CKMBINDEX, TROPONINI  No results found for: INR, PROTIME  Lab Results   Component Value Date    TSH 2.150 03/09/2021     Lab Results   Component Value Date    LABA1C 5.9 08/11/2021     No results found for: EAG  Lab Results   Component Value Date    CHOL 208 (H) 03/09/2021     Lab Results Component Value Date    TRIG 125 03/09/2021     Lab Results   Component Value Date    HDL 40 03/09/2021     Lab Results   Component Value Date    LDLCALC 143 (H) 03/09/2021     Lab Results   Component Value Date    LABVLDL 25 03/09/2021     No results found for: CHOLHDLRATIO  No results for input(s): PROBNP in the last 72 hours. Cardiac Tests:  ECG:   3/10/2022: Sinus bradycardia 51 bpm.  Normal axis and intervals. Nonspecific T wave changes. Echocardiogram:   1/25/2022 transthoracic echo  Mildly reduced LV EF 40-45%  Normal RV size and function  Normal atria  No significant valvular abnormalities    Stress test:    Exercise treadmill stress 3/1/2022  8 minutes 17 seconds Vince protocol, 10.1 METS, MPHR 70%  PVCs and exercise and recovery  No ST changes at submaximal workload  Impression:    1. Exercise EKG was negative. 2. The patient experienced no chest pain with exercise. 3. Exercise capacity was average. 4. Low risk general exercise treadmill test.    Cardiac catheterization:     14-day event monitor 12/2021  Baseline rhythm was sinus with an average heart rate of 68 bpm (81312)  1 episode NSVT 7 beats  21 episodes nonsustained SVT, longest 11.2 seconds  Occasional PVCs 2.0%, rare PACs  Episodes of bigeminy and trigeminy  Triggered episodes correlated with SVT, sinus, bigeminy/trigeminy, PVCs and PACs    Orders Placed This Encounter   Procedures    CTA CORON EJECT FRAC WALL MOTION    EKG 12 Lead        Requested Prescriptions      No prescriptions requested or ordered in this encounter        ASSESSMENT / PLAN:  1. Chest pain. Some typical and atypical features. 2. Cardiomyopathy, EF 40-45%. Etiology unclear  3. Chronic HFmEF. Euvolemic  4. Paroxysmal atrial fibrillation. Follows with EP. Metoprolol/apixaban. Previously amiodarone. 5. History of CVA  6. SLE. On HCTZ per rheumatology for calcium issues  7. History of DVT  8. History of COVID-19 4/2021  9.  Fried's esophagus with dysplasia  10. Anxiety/depression  11. Tobacco abuse    Recommendations:  Recent stress test was nondiagnostic and insufficient to rule out ischemic etiology of her mild cardiomyopathy, which on my review looked to be about 4550% with mildly hypokinesis globally. Furthermore she has chest pain which needs to be investigated. She is euvolemic today with no evidence of congestive heart failure. · Coronary CTA to rule out ischemia  · Continue metoprolol succinate  · Would likely not tolerate addition of ACE/ARB/ARN I due to baseline low blood pressure and frequent dizziness  · On HCTZ per rheumatology  · Continue apixaban for stroke risk reduction  · Aggressive risk factor modification including smoking cessation recommend  · Further recommendations pending review of CCTA  · Follow-up in 3 months or sooner if need arises    Thank you for allowing me to participate in your patient's care. Please feel free to contact me if you have any questions or concerns.       Octavio Mesa MD, [facility]

## 2022-03-28 DIAGNOSIS — R07.9 CHEST PAIN, UNSPECIFIED TYPE: Primary | ICD-10-CM

## 2022-05-13 ENCOUNTER — HOSPITAL ENCOUNTER (OUTPATIENT)
Age: 62
Discharge: HOME | End: 2022-05-13
Payer: MEDICAID

## 2022-05-13 DIAGNOSIS — R07.9 CHEST PAIN, UNSPECIFIED TYPE: ICD-10-CM

## 2022-05-13 LAB
ANION GAP SERPL CALCULATED.3IONS-SCNC: 8 MMOL/L (ref 7–16)
BUN BLDV-MCNC: 18 MG/DL (ref 6–20)
CALCIUM SERPL-MCNC: 8.9 MG/DL (ref 8.6–10.2)
CHLORIDE BLD-SCNC: 103 MMOL/L (ref 98–107)
CO2: 28 MMOL/L (ref 22–29)
CREAT SERPL-MCNC: 0.8 MG/DL (ref 0.5–1)
GFR AFRICAN AMERICAN: >60
GFR NON-AFRICAN AMERICAN: >60 ML/MIN/1.73
GLUCOSE BLD-MCNC: 159 MG/DL (ref 74–99)
POTASSIUM SERPL-SCNC: 4 MMOL/L (ref 3.5–5)
SODIUM BLD-SCNC: 139 MMOL/L (ref 132–146)

## 2022-05-13 PROCEDURE — 36415 COLL VENOUS BLD VENIPUNCTURE: CPT

## 2022-05-13 PROCEDURE — 80048 BASIC METABOLIC PNL TOTAL CA: CPT

## 2022-05-19 ENCOUNTER — HOSPITAL ENCOUNTER (OUTPATIENT)
Dept: CT IMAGING | Age: 62
Discharge: HOME | End: 2022-05-21
Payer: MEDICAID

## 2022-05-19 VITALS
OXYGEN SATURATION: 98 % | RESPIRATION RATE: 18 BRPM | HEIGHT: 68 IN | DIASTOLIC BLOOD PRESSURE: 72 MMHG | WEIGHT: 160 LBS | HEART RATE: 54 BPM | SYSTOLIC BLOOD PRESSURE: 128 MMHG | BODY MASS INDEX: 24.25 KG/M2

## 2022-05-19 DIAGNOSIS — R07.9 CHEST PAIN, UNSPECIFIED TYPE: ICD-10-CM

## 2022-05-19 DIAGNOSIS — I42.9 CARDIOMYOPATHY, UNSPECIFIED TYPE (HCC): ICD-10-CM

## 2022-05-19 PROCEDURE — 7100000010 HC PHASE II RECOVERY - FIRST 15 MIN

## 2022-05-19 PROCEDURE — 7100000011 CTA CORON EJECT FRAC WALL MOTION

## 2022-05-19 PROCEDURE — 36415 COLL VENOUS BLD VENIPUNCTURE: CPT

## 2022-05-19 PROCEDURE — 2580000003 HC RX 258: Performed by: RADIOLOGY

## 2022-05-19 PROCEDURE — 6370000000 HC RX 637 (ALT 250 FOR IP): Performed by: INTERNAL MEDICINE

## 2022-05-19 PROCEDURE — 7100000011 HC PHASE II RECOVERY - ADDTL 15 MIN

## 2022-05-19 PROCEDURE — 75574 CT ANGIO HRT W/3D IMAGE: CPT | Performed by: INTERNAL MEDICINE

## 2022-05-19 PROCEDURE — 6360000004 HC RX CONTRAST MEDICATION: Performed by: RADIOLOGY

## 2022-05-19 RX ORDER — NITROGLYCERIN 0.4 MG/1
0.8 TABLET SUBLINGUAL ONCE
Status: COMPLETED | OUTPATIENT
Start: 2022-05-19 | End: 2022-05-19

## 2022-05-19 RX ORDER — 0.9 % SODIUM CHLORIDE 0.9 %
1000 INTRAVENOUS SOLUTION INTRAVENOUS ONCE
Status: DISCONTINUED | OUTPATIENT
Start: 2022-05-19 | End: 2022-05-22 | Stop reason: HOSPADM

## 2022-05-19 RX ORDER — SODIUM CHLORIDE 0.9 % (FLUSH) 0.9 %
10 SYRINGE (ML) INJECTION
Status: COMPLETED | OUTPATIENT
Start: 2022-05-19 | End: 2022-05-19

## 2022-05-19 RX ADMIN — Medication 10 ML: at 08:55

## 2022-05-19 RX ADMIN — IOPAMIDOL 60 ML: 755 INJECTION, SOLUTION INTRAVENOUS at 08:55

## 2022-05-19 RX ADMIN — NITROGLYCERIN 0.8 MG: 0.4 TABLET SUBLINGUAL at 09:03

## 2022-05-19 ASSESSMENT — PAIN SCALES - GENERAL: PAINLEVEL_OUTOF10: 0

## 2022-05-19 NOTE — FLOWSHEET NOTE
9587 Ntg 0.8mg sl given for CTA Graft Donor Site Bandage (Optional-Leave Blank If You Don't Want In Note): Steri-strips and a pressure bandage were applied to the donor site.

## 2022-05-19 NOTE — PROCEDURES
0730Patient arrived via for CTA coronary arteries. Allergies reviewed, instructions given to  include protocol for heart rate, b/p, necessary medications that will be given, IV site and proper breath hold during scan. Questions answered and expressed understanding confirmed. Placed on monitoring devices for heart rate and rhythm, B/P taken,  IV flushed / started, flushed and prn adapter attached. Dr Chivo Caruso called for orders, updated on pts history/vs, orders received.

## 2022-05-19 NOTE — PROGRESS NOTES
Patient's jewelry removed and placed in an envelope. Envelope given to patient. Patient left CT department with jewelry.  ( 1 necklace with 2 charms)

## 2022-05-19 NOTE — PROCEDURES
0915Patient returned from procedure. Patient stable. No s/s of complications noted or reported. Vitals will be checked q 15min, see flow sheets. Patient eating and drinking well with no s/s of complications noted or reported. 0945Patient discharged,  Discharge papers reviewed with patient, questions answered, discharge paper signed. Patient taken to door. Patient in stable condition, no s/s of complications noted or reported.

## 2022-05-20 NOTE — RESULT ENCOUNTER NOTE
Coronary CT angiogram showed moderate coronary artery disease with coronary calcification with a coronary artery calcium score of 75. There were no severe blockages. Based on this she does not need a heart catheterization at this time. At this point medical treatment is recommended. She is already on apixaban so does not need aspirin. Should be on a statin, especially with history of a stroke so would recommend atorvastatin 40 mg daily. Follow-up scheduled. We will plan to repeat echocardiogram to check the pumping function of the heart in the future.

## 2022-05-24 ENCOUNTER — TELEPHONE (OUTPATIENT)
Dept: CARDIOLOGY CLINIC | Age: 62
End: 2022-05-24

## 2022-05-24 RX ORDER — ATORVASTATIN CALCIUM 40 MG/1
40 TABLET, FILM COATED ORAL DAILY
Qty: 30 TABLET | Refills: 5 | Status: SHIPPED | OUTPATIENT
Start: 2022-05-24

## 2022-05-24 NOTE — TELEPHONE ENCOUNTER
Contacted patient with results and recommendations per Dr. Constance Pandya. She verbalized understanding.    ----- Message from Leatha Byrd MD sent at 5/20/2022  5:06 PM EDT -----  Coronary CT angiogram showed moderate coronary artery disease with coronary calcification with a coronary artery calcium score of 75. There were no severe blockages. Based on this she does not need a heart catheterization at this time. At this point medical treatment is recommended. She is already on apixaban so does not need aspirin. Should be on a statin, especially with history of a stroke so would recommend atorvastatin 40 mg daily. Follow-up scheduled. We will plan to repeat echocardiogram to check the pumping function of the heart in the future.

## 2022-06-17 DIAGNOSIS — F41.9 ANXIETY AND DEPRESSION: ICD-10-CM

## 2022-06-17 DIAGNOSIS — F32.A ANXIETY AND DEPRESSION: ICD-10-CM

## 2022-06-17 NOTE — TELEPHONE ENCOUNTER
Last Appointment   2/16/2022  Next Appointment  Visit date not found      Called patient to schedule follow up. She was at another appointment and will call back to schedule. I advised that Dr Иван Arora was done as of today and she will need to schedule with Dr Freda William, or another resident.

## 2022-06-19 RX ORDER — ESCITALOPRAM OXALATE 20 MG/1
TABLET ORAL
Qty: 30 TABLET | Refills: 0 | Status: SHIPPED
Start: 2022-06-19 | End: 2022-07-25

## 2022-07-23 DIAGNOSIS — F32.A ANXIETY AND DEPRESSION: ICD-10-CM

## 2022-07-23 DIAGNOSIS — F41.9 ANXIETY AND DEPRESSION: ICD-10-CM

## 2022-07-25 RX ORDER — ESCITALOPRAM OXALATE 20 MG/1
TABLET ORAL
Qty: 30 TABLET | Refills: 0 | Status: SHIPPED
Start: 2022-07-25 | End: 2022-09-14 | Stop reason: SDUPTHER

## 2022-07-25 NOTE — TELEPHONE ENCOUNTER
Last Appointment   2/16/2022  Next Appointment  Visit date not found    Left detailed message advising patient to schedule with Dr Orquidea Madison or any available resident to establish within 30 days to continue getting refills.

## 2022-08-25 ENCOUNTER — OFFICE VISIT (OUTPATIENT)
Dept: FAMILY MEDICINE CLINIC | Age: 62
End: 2022-08-25
Payer: MEDICAID

## 2022-08-25 VITALS
HEART RATE: 67 BPM | OXYGEN SATURATION: 97 % | BODY MASS INDEX: 25.31 KG/M2 | SYSTOLIC BLOOD PRESSURE: 103 MMHG | RESPIRATION RATE: 18 BRPM | WEIGHT: 167 LBS | HEIGHT: 68 IN | DIASTOLIC BLOOD PRESSURE: 57 MMHG

## 2022-08-25 DIAGNOSIS — M05.9 RHEUMATOID ARTHRITIS WITH POSITIVE RHEUMATOID FACTOR, INVOLVING UNSPECIFIED SITE (HCC): ICD-10-CM

## 2022-08-25 DIAGNOSIS — I48.91 ATRIAL FIBRILLATION, UNSPECIFIED TYPE (HCC): ICD-10-CM

## 2022-08-25 DIAGNOSIS — F40.10 SOCIAL PHOBIA: Primary | ICD-10-CM

## 2022-08-25 PROCEDURE — 4004F PT TOBACCO SCREEN RCVD TLK: CPT | Performed by: FAMILY MEDICINE

## 2022-08-25 PROCEDURE — G8419 CALC BMI OUT NRM PARAM NOF/U: HCPCS | Performed by: FAMILY MEDICINE

## 2022-08-25 PROCEDURE — 99214 OFFICE O/P EST MOD 30 MIN: CPT | Performed by: FAMILY MEDICINE

## 2022-08-25 PROCEDURE — 3017F COLORECTAL CA SCREEN DOC REV: CPT | Performed by: FAMILY MEDICINE

## 2022-08-25 PROCEDURE — G8427 DOCREV CUR MEDS BY ELIG CLIN: HCPCS | Performed by: FAMILY MEDICINE

## 2022-08-25 RX ORDER — HYDROXYZINE PAMOATE 25 MG/1
25 CAPSULE ORAL 3 TIMES DAILY PRN
Qty: 30 CAPSULE | Refills: 2 | Status: SHIPPED | OUTPATIENT
Start: 2022-08-25 | End: 2022-09-24

## 2022-08-25 RX ORDER — BUPROPION HYDROCHLORIDE 150 MG/1
150 TABLET ORAL EVERY MORNING
Qty: 30 TABLET | Refills: 3 | Status: CANCELLED | OUTPATIENT
Start: 2022-08-25

## 2022-08-25 ASSESSMENT — ANXIETY QUESTIONNAIRES
2. NOT BEING ABLE TO STOP OR CONTROL WORRYING: 1
1. FEELING NERVOUS, ANXIOUS, OR ON EDGE: 1
6. BECOMING EASILY ANNOYED OR IRRITABLE: 0
7. FEELING AFRAID AS IF SOMETHING AWFUL MIGHT HAPPEN: 0
4. TROUBLE RELAXING: 1
3. WORRYING TOO MUCH ABOUT DIFFERENT THINGS: 1
5. BEING SO RESTLESS THAT IT IS HARD TO SIT STILL: 0
GAD7 TOTAL SCORE: 4

## 2022-08-25 ASSESSMENT — PATIENT HEALTH QUESTIONNAIRE - PHQ9
5. POOR APPETITE OR OVEREATING: 0
10. IF YOU CHECKED OFF ANY PROBLEMS, HOW DIFFICULT HAVE THESE PROBLEMS MADE IT FOR YOU TO DO YOUR WORK, TAKE CARE OF THINGS AT HOME, OR GET ALONG WITH OTHER PEOPLE: 0
3. TROUBLE FALLING OR STAYING ASLEEP: 0
SUM OF ALL RESPONSES TO PHQ QUESTIONS 1-9: 0
4. FEELING TIRED OR HAVING LITTLE ENERGY: 0
2. FEELING DOWN, DEPRESSED OR HOPELESS: 0
SUM OF ALL RESPONSES TO PHQ QUESTIONS 1-9: 0
SUM OF ALL RESPONSES TO PHQ9 QUESTIONS 1 & 2: 0
6. FEELING BAD ABOUT YOURSELF - OR THAT YOU ARE A FAILURE OR HAVE LET YOURSELF OR YOUR FAMILY DOWN: 0
8. MOVING OR SPEAKING SO SLOWLY THAT OTHER PEOPLE COULD HAVE NOTICED. OR THE OPPOSITE, BEING SO FIGETY OR RESTLESS THAT YOU HAVE BEEN MOVING AROUND A LOT MORE THAN USUAL: 0
SUM OF ALL RESPONSES TO PHQ QUESTIONS 1-9: 0
SUM OF ALL RESPONSES TO PHQ QUESTIONS 1-9: 0
1. LITTLE INTEREST OR PLEASURE IN DOING THINGS: 0
9. THOUGHTS THAT YOU WOULD BE BETTER OFF DEAD, OR OF HURTING YOURSELF: 0
7. TROUBLE CONCENTRATING ON THINGS, SUCH AS READING THE NEWSPAPER OR WATCHING TELEVISION: 0

## 2022-08-25 NOTE — PROGRESS NOTES
9/1/2022    Aurora Velazquez is a 63 y.o. femalehere for:    HPI:    Here for mood and anxiety  Mood is better   Sleeping is good , takes trazadone for that   Appetite is good  No SI/HI   Does have family and friends around   Feeling more anxious at this time   She feels she gets anxious when she is around too many people and stuff happening around e.g. too loud, too much noise and lot of conversation   If she is at grocery store and there are  lot of crowd that bothers her too  Hx of stroke   Does not process thoughts at same time  She said she was at [employer] other day and felt overwhemled she said she had to call her son to help her in store.    Also social gathering, music, adults, kids gives her anxiety     She said no concerns when she is at home       BP Readings from Last 3 Encounters:   08/25/22 (!) 103/57   05/19/22 128/72   03/10/22 100/80     Current Outpatient Medications   Medication Sig Dispense Refill    hydrOXYzine pamoate (VISTARIL) 25 MG capsule Take 1 capsule by mouth 3 times daily as needed for Anxiety 30 capsule 2    apixaban (ELIQUIS) 5 MG TABS tablet TAKE 1 TABLET BY MOUTH TWICE DAILY 60 tablet 2    escitalopram (LEXAPRO) 20 MG tablet TAKE 1 TABLET BY MOUTH EVERY MORNING 30 tablet 0    atorvastatin (LIPITOR) 40 MG tablet Take 1 tablet by mouth daily 30 tablet 5    traZODone (DESYREL) 50 MG tablet Take 1 tablet by mouth nightly (Patient taking differently: Take 25 mg by mouth nightly) 30 tablet 1    metoprolol succinate (TOPROL XL) 25 MG extended release tablet TAKE 1 TABLET BY MOUTH EVERY EVENING WITH DINNER (Patient taking differently: Take 25 mg by mouth in the morning and at bedtime) 30 tablet 11    hydroCHLOROthiazide (MICROZIDE) 12.5 MG capsule TAKE 1 CAPSULE BY MOUTH EVERY DAY      famotidine (PEPCID) 40 MG tablet Take 1 tablet by mouth every evening 30 tablet 3    Cholecalciferol (VITAMIN D3) 50 MCG (2000 UT) CAPS Take 2,000 Units by mouth      folic acid (FOLVITE) 1 MG tablet Take 1 mg by mouth daily      omeprazole (PRILOSEC) 40 MG delayed release capsule Take 40 mg by mouth daily Take 1 tablet by mouth daily      hydroxychloroquine (PLAQUENIL) 200 MG tablet Take 200 mg by mouth 2 times daily Take 1 tablet by mouth twice a day      methotrexate (RHEUMATREX) 2.5 MG chemo tablet Take 2.5 mg by mouth once a week Take 6 tablets by mouth weekly      FORTEO 620 MCG/2.48ML SOPN injection Inject 20 mcg into the skin daily  (Patient not taking: No sig reported)       No current facility-administered medications for this visit. Allergies   Allergen Reactions    Morphine Anaphylaxis     Rapid drop in BP     Phenergan [Promethazine] Other (See Comments)     Increased anxity    Ativan [Lorazepam] Anxiety    Penicillins Rash       Past Medical & Surgical History:      Diagnosis Date    Anxiety and depression     Atrial fibrillation (HCC)     Fried's esophagus with dysplasia     COVID-19 04/04/2021    Hx of deep venous thrombosis     Lupus ([facility] [address]     Stroke ([facility] [address]      No past surgical history on file.     Family History:      Problem Relation Age of Onset    Stroke Mother         minor    Colon Polyps Mother         precanceous    Heart Attack Father 58        ortic aneurism as well    Colon Cancer Brother 40    Deep Vein Thrombosis Brother         And PE    Stroke Brother     Colon Cancer Paternal Grandmother 80    Colon Cancer Brother 39    Deep Vein Thrombosis Brother     Seizures Brother        Social History:  Social History     Tobacco Use    Smoking status: Every Day     Packs/day: 0.25     Years: 40.00     Pack years: 10.00     Types: Cigarettes     Start date: 1980    Smokeless tobacco: Never   Substance Use Topics    Alcohol use: Yes     Comment: very rarely       Immunization History   Administered Date(s) Administered    Influenza, FLUARIX, FLULAVAL, FLUZONE (age 10 mo+) AND AFLURIA, (age 1 y+), PF, 0.5mL 03/09/2021       Review of Systems   Constitutional:  Negative for chills and fever.   HENT:  Negative for congestion, sore throat and trouble swallowing. Respiratory:  Negative for cough. Cardiovascular:  Negative for chest pain and leg swelling. Gastrointestinal:  Negative for abdominal pain, constipation, diarrhea, nausea and vomiting. Genitourinary:  Negative for difficulty urinating. Musculoskeletal:  Negative for arthralgias and myalgias. Skin:  Negative for rash and wound. Neurological:  Negative for dizziness and headaches. Psychiatric/Behavioral:  Negative for agitation. The patient is nervous/anxious. VS:  BP (!) 103/57   Pulse 67   Resp 18   Ht 5' 8\" (1.727 m)   Wt 167 lb (75.8 kg)   SpO2 97%   BMI 25.39 kg/m²     Physical Exam  Constitutional:       Appearance: Normal appearance. HENT:      Head: Normocephalic. Nose: Nose normal. No rhinorrhea. Eyes:      General: No scleral icterus. Conjunctiva/sclera: Conjunctivae normal.   Cardiovascular:      Rate and Rhythm: Normal rate and regular rhythm. Pulses: Normal pulses. Heart sounds: Normal heart sounds. No murmur heard. Pulmonary:      Breath sounds: Normal breath sounds. No wheezing, rhonchi or rales. Abdominal:      General: Abdomen is flat. Bowel sounds are normal.   Musculoskeletal:      Right lower leg: No edema. Left lower leg: No edema. Skin:     General: Skin is warm. Findings: No rash. Neurological:      General: No focal deficit present. Mental Status: She is alert. Psychiatric:         Mood and Affect: Mood normal.         Behavior: Behavior normal.       Assessment/Plan:    Social Phobia   Likely anxiety related   Will start her on vistaril 25 mg daily for now  No acute concerns otherwise  Recommended meditation and relaxation techniques     Hx of rheumatoid arthritis  On plaquenil and MTX   Continue fu with rheumatology     Med refill : eliquis due to hx of A.  Fib     Follow up:  4-6 weeks     Patient agrees with the above stated plan.    Jailene Ramírez received counseling on the following healthy behaviors: nutrition, exercise, and medication adherence.     Karen Choudhary MD  PGY-3 Family Medicine

## 2022-09-01 PROBLEM — F40.10 SOCIAL PHOBIA: Status: ACTIVE | Noted: 2022-09-01

## 2022-09-01 PROBLEM — F41.9 ANXIETY: Status: ACTIVE | Noted: 2022-09-01

## 2022-09-01 ASSESSMENT — ENCOUNTER SYMPTOMS
VOMITING: 0
NAUSEA: 0
COUGH: 0
CONSTIPATION: 0
TROUBLE SWALLOWING: 0
ABDOMINAL PAIN: 0
SORE THROAT: 0
DIARRHEA: 0

## 2022-09-13 ENCOUNTER — HOSPITAL ENCOUNTER (EMERGENCY)
Age: 62
Discharge: HOME | End: 2022-09-13
Payer: MEDICAID

## 2022-09-13 VITALS
BODY MASS INDEX: 25.46 KG/M2 | HEIGHT: 68 IN | RESPIRATION RATE: 16 BRPM | OXYGEN SATURATION: 100 % | SYSTOLIC BLOOD PRESSURE: 130 MMHG | DIASTOLIC BLOOD PRESSURE: 83 MMHG | TEMPERATURE: 97.4 F | WEIGHT: 168 LBS | HEART RATE: 68 BPM

## 2022-09-13 DIAGNOSIS — F41.9 ANXIETY AND DEPRESSION: ICD-10-CM

## 2022-09-13 DIAGNOSIS — F32.A ANXIETY AND DEPRESSION: ICD-10-CM

## 2022-09-13 DIAGNOSIS — L03.213 PRESEPTAL CELLULITIS OF LEFT EYE: Primary | ICD-10-CM

## 2022-09-13 PROCEDURE — 6370000000 HC RX 637 (ALT 250 FOR IP): Performed by: PHYSICIAN ASSISTANT

## 2022-09-13 PROCEDURE — 99283 EMERGENCY DEPT VISIT LOW MDM: CPT

## 2022-09-13 RX ORDER — CEFDINIR 300 MG/1
300 CAPSULE ORAL 2 TIMES DAILY
Qty: 20 CAPSULE | Refills: 0 | Status: SHIPPED | OUTPATIENT
Start: 2022-09-13 | End: 2022-09-23

## 2022-09-13 RX ORDER — HYDROCODONE BITARTRATE AND ACETAMINOPHEN 5; 325 MG/1; MG/1
1 TABLET ORAL ONCE
Status: COMPLETED | OUTPATIENT
Start: 2022-09-13 | End: 2022-09-13

## 2022-09-13 RX ORDER — CEFDINIR 300 MG/1
300 CAPSULE ORAL ONCE
Status: COMPLETED | OUTPATIENT
Start: 2022-09-13 | End: 2022-09-13

## 2022-09-13 RX ADMIN — HYDROCODONE BITARTRATE AND ACETAMINOPHEN 1 TABLET: 5; 325 TABLET ORAL at 21:43

## 2022-09-13 RX ADMIN — CEFDINIR 300 MG: 300 CAPSULE ORAL at 21:43

## 2022-09-13 ASSESSMENT — PAIN - FUNCTIONAL ASSESSMENT
PAIN_FUNCTIONAL_ASSESSMENT: NONE - DENIES PAIN
PAIN_FUNCTIONAL_ASSESSMENT: 0-10
PAIN_FUNCTIONAL_ASSESSMENT: PREVENTS OR INTERFERES SOME ACTIVE ACTIVITIES AND ADLS

## 2022-09-13 ASSESSMENT — PAIN SCALES - GENERAL
PAINLEVEL_OUTOF10: 6
PAINLEVEL_OUTOF10: 7

## 2022-09-13 ASSESSMENT — PAIN DESCRIPTION - LOCATION: LOCATION: EYE

## 2022-09-13 ASSESSMENT — PAIN DESCRIPTION - DESCRIPTORS: DESCRIPTORS: THROBBING;PRESSURE

## 2022-09-13 ASSESSMENT — PAIN DESCRIPTION - ORIENTATION: ORIENTATION: LEFT

## 2022-09-14 ENCOUNTER — TELEPHONE (OUTPATIENT)
Dept: ADMINISTRATIVE | Age: 62
End: 2022-09-14

## 2022-09-14 RX ORDER — ESCITALOPRAM OXALATE 20 MG/1
TABLET ORAL
Qty: 30 TABLET | Refills: 1 | Status: SHIPPED | OUTPATIENT
Start: 2022-09-14

## 2022-09-14 NOTE — TELEPHONE ENCOUNTER
Patient called to schedule an appt sooner than Dec; she has been in & out of town to care for elderly parents; experiencing dizziness and palpitations;  please call her w/appt or advice at [phone]

## 2022-09-14 NOTE — ED PROVIDER NOTES
in her brother and mother. Allergies: Morphine, Phenergan [promethazine], Ativan [lorazepam], and Penicillins    PHYSICAL EXAM   Oxygen Saturation Interpretation: Normal on room air analysis. ED Triage Vitals [09/13/22 2045]   BP Temp Temp src Heart Rate Resp SpO2 Height Weight   130/83 97.4 °F (36.3 °C) -- 68 16 100 % 5' 8\" (1.727 m) 168 lb (76.2 kg)         Physical Exam  Constitutional/General: Alert and oriented x3, well appearing, non toxic  HEENT:  NC/NT. Airway patent. There is edema along the lower eyelid extending into the bulbar conjunctive a without injection or evidence of foreign body. Pupils are equal and reactive light accommodation bilaterally. EOMs are intact bilaterally. There is erythema to the inferior aspect of the lower lid extending to the cheek just above the zygoma. There is no drainage noted. There are no wounds noted. Remainder of the face is unremarkable. Bilateral external canals intermittent membranes are unremarkable. Oropharynx is clear. Nasal passages unremarkable bilaterally. Neck: Supple, full ROM, non tender to palpation in the midline, no stridor, no crepitus, no meningeal signs  Respiratory: Lungs clear to auscultation bilaterally, no wheezes, rales, or rhonchi. Not in respiratory distress  CV:  Regular rate. Regular rhythm. No murmurs, gallops, or rubs. 2+ distal pulses  Musculoskeletal: Moves all extremities x 4. Warm and well perfused, no clubbing, cyanosis, or edema. Capillary refill <3 seconds      Lab / Imaging Results   (All laboratory and radiology results have been personally reviewed by myself)  Labs:  No results found for this visit on 09/13/22. Imaging: All Radiology results interpreted by Radiologist unless otherwise noted.   No orders to display       ED Course / Medical Decision Making     Medications   cefdinir (OMNICEF) capsule 300 mg (has no administration in time range)   HYDROcodone-acetaminophen (NORCO) 5-325 MG per tablet 1 tablet

## 2022-09-16 ENCOUNTER — OFFICE VISIT (OUTPATIENT)
Dept: FAMILY MEDICINE CLINIC | Age: 62
End: 2022-09-16
Payer: MEDICAID

## 2022-09-16 VITALS
HEIGHT: 68 IN | WEIGHT: 164 LBS | DIASTOLIC BLOOD PRESSURE: 69 MMHG | BODY MASS INDEX: 24.86 KG/M2 | SYSTOLIC BLOOD PRESSURE: 114 MMHG | OXYGEN SATURATION: 98 % | HEART RATE: 65 BPM | RESPIRATION RATE: 18 BRPM

## 2022-09-16 DIAGNOSIS — L03.213 PRESEPTAL CELLULITIS OF LEFT EYE: Primary | ICD-10-CM

## 2022-09-16 PROCEDURE — G8427 DOCREV CUR MEDS BY ELIG CLIN: HCPCS | Performed by: FAMILY MEDICINE

## 2022-09-16 PROCEDURE — 99213 OFFICE O/P EST LOW 20 MIN: CPT | Performed by: FAMILY MEDICINE

## 2022-09-16 PROCEDURE — 3017F COLORECTAL CA SCREEN DOC REV: CPT | Performed by: FAMILY MEDICINE

## 2022-09-16 PROCEDURE — G8420 CALC BMI NORM PARAMETERS: HCPCS | Performed by: FAMILY MEDICINE

## 2022-09-16 PROCEDURE — 4004F PT TOBACCO SCREEN RCVD TLK: CPT | Performed by: FAMILY MEDICINE

## 2022-09-16 ASSESSMENT — ENCOUNTER SYMPTOMS
CONSTIPATION: 0
ABDOMINAL PAIN: 0
COUGH: 0
NAUSEA: 0
TROUBLE SWALLOWING: 0
DIARRHEA: 0
VOMITING: 0
SORE THROAT: 0

## 2022-09-16 NOTE — PROGRESS NOTES
S: 63 y.o. female with   Chief Complaint   Patient presents with    Follow-Up from Hospital     Under eye swelling, fully better        ER follow up for eye swelling tx with omnicef - improving. O: VS:  height is 5' 8\" (1.727 m) and weight is 164 lb (74.4 kg). Her blood pressure is 114/69 and her pulse is 65. Her respiration is 18 and oxygen saturation is 98%. BP Readings from Last 3 Encounters:   09/16/22 114/69   09/13/22 130/83   08/25/22 (!) 103/57     See resident note      Impression/Plan:   1) Eye Swelling/?cellulitis? - improving at this time - finish meds. Health Maintenance Due   Topic Date Due    COVID-19 Vaccine (1) Never done    Pneumococcal 0-64 years Vaccine (1 - PCV) Never done    HIV screen  Never done    Hepatitis C screen  Never done    DTaP/Tdap/Td vaccine (1 - Tdap) Never done    Breast cancer screen  Never done    Shingles vaccine (1 of 2) Never done    Lipids  03/09/2022    A1C test (Diabetic or Prediabetic)  08/11/2022    Flu vaccine (1) 09/01/2022         Attending Physician Statement  I have discussed the case, including pertinent history and exam findings with the resident. I agree with the documented assessment and plan.       Nori Orellana MD

## 2022-09-16 NOTE — PROGRESS NOTES
9/16/2022    Sherry Mcgregor is a 63 y.o. femalehere for:    HPI:  Here for ER fu after for preseptal celluliis of left eye on 09/13/2022  Was attributed to insect bit  Did not hurt herself or no trauma to left eye  Pt was started on omnicef 300mg BID for 10 days  She is compliant with that   She said swelling is improving a lot   She has not noticed any discharge from the eye as well   No difficulty in vision   Minimal pain and pressure too which is resolving now.      BP Readings from Last 3 Encounters:   09/16/22 114/69   09/13/22 130/83   08/25/22 (!) 103/57     Current Outpatient Medications   Medication Sig Dispense Refill    escitalopram (LEXAPRO) 20 MG tablet Take one tablet by mouth every morning 30 tablet 1    cefdinir (OMNICEF) 300 MG capsule Take 1 capsule by mouth 2 times daily for 10 days 20 capsule 0    hydrOXYzine pamoate (VISTARIL) 25 MG capsule Take 1 capsule by mouth 3 times daily as needed for Anxiety 30 capsule 2    apixaban (ELIQUIS) 5 MG TABS tablet TAKE 1 TABLET BY MOUTH TWICE DAILY 60 tablet 2    atorvastatin (LIPITOR) 40 MG tablet Take 1 tablet by mouth daily 30 tablet 5    traZODone (DESYREL) 50 MG tablet Take 1 tablet by mouth nightly (Patient taking differently: Take 25 mg by mouth nightly) 30 tablet 1    metoprolol succinate (TOPROL XL) 25 MG extended release tablet TAKE 1 TABLET BY MOUTH EVERY EVENING WITH DINNER (Patient taking differently: Take 25 mg by mouth in the morning and at bedtime) 30 tablet 11    hydroCHLOROthiazide (MICROZIDE) 12.5 MG capsule TAKE 1 CAPSULE BY MOUTH EVERY DAY      famotidine (PEPCID) 40 MG tablet Take 1 tablet by mouth every evening 30 tablet 3    Cholecalciferol (VITAMIN D3) 50 MCG (2000 UT) CAPS Take 2,000 Units by mouth      folic acid (FOLVITE) 1 MG tablet Take 1 mg by mouth daily      omeprazole (PRILOSEC) 40 MG delayed release capsule Take 40 mg by mouth daily Take 1 tablet by mouth daily      hydroxychloroquine (PLAQUENIL) 200 MG tablet Take 200 mg by mouth 2 times daily Take 1 tablet by mouth twice a day      methotrexate (RHEUMATREX) 2.5 MG chemo tablet Take 2.5 mg by mouth once a week Take 6 tablets by mouth weekly       No current facility-administered medications for this visit. Allergies   Allergen Reactions    Morphine Anaphylaxis     Rapid drop in BP     Phenergan [Promethazine] Other (See Comments)     Increased anxity    Ativan [Lorazepam] Anxiety    Penicillins Rash       Past Medical & Surgical History:      Diagnosis Date    Anxiety and depression     Atrial fibrillation (HCC)     Fried's esophagus with dysplasia     COVID-19 04/04/2021    Hx of deep venous thrombosis     Lupus ([facility] [address]     Stroke ([facility] [address]      No past surgical history on file. Family History:      Problem Relation Age of Onset    Stroke Mother         minor    Colon Polyps Mother         precanceous    Heart Attack Father 58        ortic aneurism as well    Colon Cancer Brother 40    Deep Vein Thrombosis Brother         And PE    Stroke Brother     Colon Cancer Paternal Grandmother 80    Colon Cancer Brother 39    Deep Vein Thrombosis Brother     Seizures Brother        Social History:  Social History     Tobacco Use    Smoking status: Every Day     Packs/day: 0.25     Years: 40.00     Pack years: 10.00     Types: Cigarettes     Start date: 1980    Smokeless tobacco: Never   Substance Use Topics    Alcohol use: Yes     Comment: very rarely       Immunization History   Administered Date(s) Administered    Influenza, FLUARIX, FLULAVAL, FLUZONE (age 10 mo+) AND AFLURIA, (age 1 y+), PF, 0.5mL 03/09/2021       Review of Systems   Constitutional:  Negative for chills and fever. HENT:  Negative for congestion, sore throat and trouble swallowing. Respiratory:  Negative for cough. Cardiovascular:  Negative for chest pain and leg swelling. Gastrointestinal:  Negative for abdominal pain, constipation, diarrhea, nausea and vomiting.    Genitourinary:  Negative for difficulty urinating. Musculoskeletal:  Negative for arthralgias and myalgias. Skin:  Negative for rash and wound. Neurological:  Negative for dizziness and headaches. Psychiatric/Behavioral:  Negative for agitation. VS:  /69   Pulse 65   Resp 18   Ht 5' 8\" (1.727 m)   Wt 164 lb (74.4 kg)   SpO2 98%   BMI 24.94 kg/m²     Physical Exam  Constitutional:       Appearance: Normal appearance. HENT:      Head: Normocephalic. Nose: Nose normal. No rhinorrhea. Eyes:      General: No scleral icterus. Right eye: No discharge. Left eye: No discharge. Extraocular Movements: Extraocular movements intact. Conjunctiva/sclera: Conjunctivae normal.      Left eye: Left conjunctiva is not injected. No chemosis, exudate or hemorrhage. Pupils: Pupils are equal, round, and reactive to light. Cardiovascular:      Rate and Rhythm: Normal rate and regular rhythm. Pulses: Normal pulses. Heart sounds: Normal heart sounds. No murmur heard. Pulmonary:      Breath sounds: Normal breath sounds. No wheezing, rhonchi or rales. Abdominal:      General: Abdomen is flat. Bowel sounds are normal.   Musculoskeletal:      Right lower leg: No edema. Left lower leg: No edema. Skin:     General: Skin is warm. Findings: No rash. Neurological:      General: No focal deficit present. Mental Status: She is alert. Assessment/Plan:  1. Preseptal cellulitis of left eye  Pt was recommended warm compress for swelling   Pt was encouraged to finish antibiotic course  Pt has appt for fu on mood in few days     Follow up: prn     Patient agrees with the above stated plan. Caitlyn March received counseling on the following healthy behaviors: nutrition and exercise.     Caren Martins MD  PGY-3 Family Medicine

## 2022-09-20 DIAGNOSIS — F41.9 ANXIETY AND DEPRESSION: ICD-10-CM

## 2022-09-20 DIAGNOSIS — F32.A ANXIETY AND DEPRESSION: ICD-10-CM

## 2022-09-20 RX ORDER — TRAZODONE HYDROCHLORIDE 50 MG/1
TABLET ORAL
Qty: 30 TABLET | Refills: 1 | Status: SHIPPED | OUTPATIENT
Start: 2022-09-20

## 2022-10-21 ENCOUNTER — OFFICE VISIT (OUTPATIENT)
Dept: FAMILY MEDICINE CLINIC | Age: 62
End: 2022-10-21
Payer: MEDICAID

## 2022-10-21 VITALS
BODY MASS INDEX: 24.86 KG/M2 | RESPIRATION RATE: 16 BRPM | HEART RATE: 60 BPM | SYSTOLIC BLOOD PRESSURE: 102 MMHG | TEMPERATURE: 97.2 F | OXYGEN SATURATION: 98 % | DIASTOLIC BLOOD PRESSURE: 67 MMHG | WEIGHT: 164 LBS | HEIGHT: 68 IN

## 2022-10-21 DIAGNOSIS — M05.9 RHEUMATOID ARTHRITIS WITH POSITIVE RHEUMATOID FACTOR, INVOLVING UNSPECIFIED SITE (HCC): ICD-10-CM

## 2022-10-21 DIAGNOSIS — F40.10 SOCIAL PHOBIA: Primary | ICD-10-CM

## 2022-10-21 PROCEDURE — G8427 DOCREV CUR MEDS BY ELIG CLIN: HCPCS | Performed by: FAMILY MEDICINE

## 2022-10-21 PROCEDURE — 3017F COLORECTAL CA SCREEN DOC REV: CPT | Performed by: FAMILY MEDICINE

## 2022-10-21 PROCEDURE — 99213 OFFICE O/P EST LOW 20 MIN: CPT | Performed by: FAMILY MEDICINE

## 2022-10-21 PROCEDURE — G8484 FLU IMMUNIZE NO ADMIN: HCPCS | Performed by: FAMILY MEDICINE

## 2022-10-21 PROCEDURE — 4004F PT TOBACCO SCREEN RCVD TLK: CPT | Performed by: FAMILY MEDICINE

## 2022-10-21 PROCEDURE — G8420 CALC BMI NORM PARAMETERS: HCPCS | Performed by: FAMILY MEDICINE

## 2022-10-21 RX ORDER — TERIPARATIDE 250 UG/ML
INJECTION, SOLUTION SUBCUTANEOUS
COMMUNITY
Start: 2022-10-18

## 2022-10-21 NOTE — PROGRESS NOTES
S: 63 y.o. female with   Chief Complaint   Patient presents with    Depression     Follow up  Declined flu shot  Not taking Celebrex       Pt is here for follow up on her mood. Had a recent unexpected death in the family. Has good family support. She has some bad days because of the chronic illness. She takes hydroxyzine twice a week. O: VS:  height is 5' 8\" (1.727 m) and weight is 164 lb (74.4 kg). Her temporal temperature is 97.2 °F (36.2 °C). Her blood pressure is 102/67 and her pulse is 60. Her respiration is 16 and oxygen saturation is 98%. BP Readings from Last 3 Encounters:   10/21/22 102/67   09/16/22 114/69   09/13/22 130/83     See resident note    Impression/Plan:   1) social anxiety d/o - cont on current meds. 2) RA - stable. Health Maintenance Due   Topic Date Due    COVID-19 Vaccine (1) Never done    Pneumococcal 0-64 years Vaccine (1 - PCV) Never done    HIV screen  Never done    Hepatitis C screen  Never done    DTaP/Tdap/Td vaccine (1 - Tdap) Never done    Breast cancer screen  Never done    Shingles vaccine (1 of 2) Never done    Lipids  03/09/2022    Flu vaccine (1) 08/01/2022    A1C test (Diabetic or Prediabetic)  08/11/2022         Attending Physician Statement  I have discussed the case, including pertinent history and exam findings with the resident. I agree with the documented assessment and plan.       Shade Garcia MD

## 2022-10-21 NOTE — PROGRESS NOTES
11/10/2022    Elena Degroot is a 28[address] [facility] y.o. femalehere for:    HPI:    Here for mood fu   She is taking lexapro and Mood is doing ok  Has recent unexpected death in family   That made me sad  Able to function alright  Not working right now  Semi-retired   On disability   Lives at home with  and son   No SI/HI  Good appetite   Good sleep, trazadone helps  Also on lexapro 20 mg   Good support at homes  Able to enjoy activities  Hx of lupus and stroke 2010, other physical issues   On hydroxyzine for social phobia /anxiety  Seen by neuropsychiatrist , stroke in processing area of brain       BP Readings from Last 3 Encounters:   10/21/22 102/67   09/16/22 114/69   09/13/22 130/83     Current Outpatient Medications   Medication Sig Dispense Refill    Teriparatide, Recombinant, (FORTEO) 600 MCG/2.4ML SOPN injection INJECT 20MCG (0.08ML)  SUBCUTANEOUSLY DAILY  (DISCARD 28 DAYS AFTER  FIRST USE)      traZODone (DESYREL) 50 MG tablet TAKE 1 TABLET BY MOUTH EVERY NIGHT 30 tablet 1    escitalopram (LEXAPRO) 20 MG tablet Take one tablet by mouth every morning 30 tablet 1    apixaban (ELIQUIS) 5 MG TABS tablet TAKE 1 TABLET BY MOUTH TWICE DAILY 60 tablet 2    atorvastatin (LIPITOR) 40 MG tablet Take 1 tablet by mouth daily 30 tablet 5    metoprolol succinate (TOPROL XL) 25 MG extended release tablet TAKE 1 TABLET BY MOUTH EVERY EVENING WITH DINNER (Patient taking differently: Take 25 mg by mouth in the morning and at bedtime) 30 tablet 11    hydroCHLOROthiazide (MICROZIDE) 12.5 MG capsule TAKE 1 CAPSULE BY MOUTH EVERY DAY      famotidine (PEPCID) 40 MG tablet Take 1 tablet by mouth every evening 30 tablet 3    Cholecalciferol (VITAMIN D3) 50 MCG (2000 UT) CAPS Take 2,000 Units by mouth      folic acid (FOLVITE) 1 MG tablet Take 1 mg by mouth daily      omeprazole (PRILOSEC) 40 MG delayed release capsule Take 40 mg by mouth daily Take 1 tablet by mouth daily      hydroxychloroquine (PLAQUENIL) 200 MG tablet Take 200 mg by mouth 2 times daily Take 1 tablet by mouth twice a day      methotrexate (RHEUMATREX) 2.5 MG chemo tablet Take 2.5 mg by mouth once a week Take 6 tablets by mouth weekly       No current facility-administered medications for this visit. Allergies   Allergen Reactions    Morphine Anaphylaxis     Rapid drop in BP     Phenergan [Promethazine] Other (See Comments)     Increased anxity    Ativan [Lorazepam] Anxiety    Penicillins Rash       Past Medical & Surgical History:      Diagnosis Date    Anxiety and depression     Atrial fibrillation (HCC)     Fried's esophagus with dysplasia     COVID-19 04/04/2021    Hx of deep venous thrombosis     Lupus ([facility] [address]     Stroke ([facility] [address]      No past surgical history on file. Family History:      Problem Relation Age of Onset    Stroke Mother         minor    Colon Polyps Mother         precanceous    Heart Attack Father 58        ortic aneurism as well    Colon Cancer Brother 40    Deep Vein Thrombosis Brother         And PE    Stroke Brother     Colon Cancer Paternal Grandmother 80    Colon Cancer Brother 39    Deep Vein Thrombosis Brother     Seizures Brother        Social History:  Social History     Tobacco Use    Smoking status: Every Day     Packs/day: 0.25     Years: 40.00     Pack years: 10.00     Types: Cigarettes     Start date: 1980    Smokeless tobacco: Never   Substance Use Topics    Alcohol use: Yes     Comment: very rarely       Immunization History   Administered Date(s) Administered    Influenza, FLUARIX, FLULAVAL, FLUZONE (age 10 mo+) AND AFLURIA, (age 1 y+), PF, 0.5mL 03/09/2021       Review of Systems   Constitutional:  Negative for chills and fever. HENT:  Negative for congestion, sore throat and trouble swallowing. Respiratory:  Negative for cough. Cardiovascular:  Negative for chest pain and leg swelling. Gastrointestinal:  Negative for abdominal pain, constipation, diarrhea, nausea and vomiting.    Genitourinary:  Negative for difficulty urinating. Musculoskeletal:  Negative for arthralgias and myalgias. Skin:  Negative for rash and wound. Neurological:  Negative for dizziness and headaches. Psychiatric/Behavioral:  Negative for agitation. VS:  /67   Pulse 60   Temp 97.2 °F (36.2 °C) (Temporal)   Resp 16   Ht 5' 8\" (1.727 m)   Wt 164 lb (74.4 kg)   SpO2 98%   BMI 24.94 kg/m²     Physical Exam  Constitutional:       Appearance: Normal appearance. HENT:      Head: Normocephalic. Nose: Nose normal. No rhinorrhea. Eyes:      General: No scleral icterus. Conjunctiva/sclera: Conjunctivae normal.   Cardiovascular:      Rate and Rhythm: Normal rate and regular rhythm. Pulses: Normal pulses. Heart sounds: Normal heart sounds. No murmur heard. Pulmonary:      Breath sounds: Normal breath sounds. No wheezing, rhonchi or rales. Abdominal:      General: Abdomen is flat. Bowel sounds are normal.   Musculoskeletal:      Right lower leg: No edema. Left lower leg: No edema. Skin:     General: Skin is warm. Findings: No rash. Neurological:      General: No focal deficit present. Mental Status: She is alert. Assessment/Plan:  1. Social phobia  Cont vistaril as needed  Recommended meditation and relaxation techniques  Encouraged counseling if interested     2. Rheumatoid arthritis with positive rheumatoid factor, involving unspecified site ([address]  Stable, follows with rheum  Remains on plaquenil and MTX    Follow up:  as scheduled for routine    Patient agrees with the above stated plan. Rukhsana Syed received counseling on the following healthy behaviors: nutrition, exercise, and medication adherence.     Dianne Tyson MD  PGY-3 Family Medicine

## 2022-11-10 ASSESSMENT — ENCOUNTER SYMPTOMS
NAUSEA: 0
TROUBLE SWALLOWING: 0
COUGH: 0
ABDOMINAL PAIN: 0
SORE THROAT: 0
CONSTIPATION: 0
DIARRHEA: 0
VOMITING: 0

## 2022-11-13 DIAGNOSIS — F32.A ANXIETY AND DEPRESSION: ICD-10-CM

## 2022-11-13 DIAGNOSIS — F41.9 ANXIETY AND DEPRESSION: ICD-10-CM

## 2022-11-14 RX ORDER — ESCITALOPRAM OXALATE 20 MG/1
TABLET ORAL
Qty: 30 TABLET | Refills: 1 | Status: SHIPPED | OUTPATIENT
Start: 2022-11-14

## 2022-12-28 DIAGNOSIS — F41.9 ANXIETY AND DEPRESSION: ICD-10-CM

## 2022-12-28 DIAGNOSIS — F32.A ANXIETY AND DEPRESSION: ICD-10-CM

## 2022-12-28 RX ORDER — TRAZODONE HYDROCHLORIDE 50 MG/1
TABLET ORAL
Qty: 30 TABLET | Refills: 1 | Status: SHIPPED | OUTPATIENT
Start: 2022-12-28

## 2023-01-16 RX ORDER — ATORVASTATIN CALCIUM 40 MG/1
40 TABLET, FILM COATED ORAL DAILY
Qty: 30 TABLET | Refills: 5 | Status: SHIPPED | OUTPATIENT
Start: 2023-01-16

## 2023-01-24 ENCOUNTER — HOSPITAL ENCOUNTER (OUTPATIENT)
Dept: CT IMAGING | Age: 63
Discharge: HOME | End: 2023-01-26
Payer: MEDICARE

## 2023-01-24 ENCOUNTER — OFFICE VISIT (OUTPATIENT)
Dept: FAMILY MEDICINE CLINIC | Age: 63
End: 2023-01-24

## 2023-01-24 ENCOUNTER — HOSPITAL ENCOUNTER (OUTPATIENT)
Age: 63
Discharge: HOME | End: 2023-01-24
Payer: MEDICARE

## 2023-01-24 ENCOUNTER — HOSPITAL ENCOUNTER (OUTPATIENT)
Age: 63
Discharge: HOME | End: 2023-01-26

## 2023-01-24 VITALS
HEART RATE: 58 BPM | OXYGEN SATURATION: 96 % | WEIGHT: 164 LBS | BODY MASS INDEX: 24.86 KG/M2 | HEIGHT: 68 IN | SYSTOLIC BLOOD PRESSURE: 99 MMHG | DIASTOLIC BLOOD PRESSURE: 57 MMHG | TEMPERATURE: 97.2 F

## 2023-01-24 DIAGNOSIS — N30.00 ACUTE CYSTITIS WITHOUT HEMATURIA: ICD-10-CM

## 2023-01-24 DIAGNOSIS — Z87.442 HISTORY OF NEPHROLITHIASIS: ICD-10-CM

## 2023-01-24 DIAGNOSIS — R10.819 RIGHT FLANK TENDERNESS: ICD-10-CM

## 2023-01-24 DIAGNOSIS — N30.00 ACUTE CYSTITIS WITHOUT HEMATURIA: Primary | ICD-10-CM

## 2023-01-24 LAB
ANION GAP SERPL CALCULATED.3IONS-SCNC: 6 MMOL/L (ref 7–16)
BILIRUBIN, POC: NORMAL
BLOOD URINE, POC: NORMAL
BUN BLDV-MCNC: 15 MG/DL (ref 6–23)
CALCIUM SERPL-MCNC: 9.1 MG/DL (ref 8.6–10.2)
CHLORIDE BLD-SCNC: 104 MMOL/L (ref 98–107)
CLARITY, POC: CLEAR
CO2: 30 MMOL/L (ref 22–29)
COLOR, POC: NORMAL
CREAT SERPL-MCNC: 0.7 MG/DL (ref 0.5–1)
GFR SERPL CREATININE-BSD FRML MDRD: >60 ML/MIN/1.73
GLUCOSE BLD-MCNC: 122 MG/DL (ref 74–99)
GLUCOSE URINE, POC: NORMAL
KETONES, POC: NORMAL
LEUKOCYTE EST, POC: NORMAL
NITRITE, POC: NORMAL
PH, POC: 7
POTASSIUM SERPL-SCNC: 3.8 MMOL/L (ref 3.5–5)
PROTEIN, POC: NORMAL
SODIUM BLD-SCNC: 140 MMOL/L (ref 132–146)
SPECIFIC GRAVITY, POC: 1.03
UROBILINOGEN, POC: 1

## 2023-01-24 PROCEDURE — 80048 BASIC METABOLIC PNL TOTAL CA: CPT

## 2023-01-24 PROCEDURE — 36415 COLL VENOUS BLD VENIPUNCTURE: CPT

## 2023-01-24 PROCEDURE — 74176 CT ABD & PELVIS W/O CONTRAST: CPT

## 2023-01-24 SDOH — ECONOMIC STABILITY: FOOD INSECURITY: WITHIN THE PAST 12 MONTHS, THE FOOD YOU BOUGHT JUST DIDN'T LAST AND YOU DIDN'T HAVE MONEY TO GET MORE.: NEVER TRUE

## 2023-01-24 SDOH — ECONOMIC STABILITY: FOOD INSECURITY: WITHIN THE PAST 12 MONTHS, YOU WORRIED THAT YOUR FOOD WOULD RUN OUT BEFORE YOU GOT MONEY TO BUY MORE.: NEVER TRUE

## 2023-01-24 ASSESSMENT — PATIENT HEALTH QUESTIONNAIRE - PHQ9
6. FEELING BAD ABOUT YOURSELF - OR THAT YOU ARE A FAILURE OR HAVE LET YOURSELF OR YOUR FAMILY DOWN: 0
SUM OF ALL RESPONSES TO PHQ QUESTIONS 1-9: 3
9. THOUGHTS THAT YOU WOULD BE BETTER OFF DEAD, OR OF HURTING YOURSELF: 0
10. IF YOU CHECKED OFF ANY PROBLEMS, HOW DIFFICULT HAVE THESE PROBLEMS MADE IT FOR YOU TO DO YOUR WORK, TAKE CARE OF THINGS AT HOME, OR GET ALONG WITH OTHER PEOPLE: 1
4. FEELING TIRED OR HAVING LITTLE ENERGY: 1
1. LITTLE INTEREST OR PLEASURE IN DOING THINGS: 0
SUM OF ALL RESPONSES TO PHQ QUESTIONS 1-9: 3
SUM OF ALL RESPONSES TO PHQ QUESTIONS 1-9: 3
7. TROUBLE CONCENTRATING ON THINGS, SUCH AS READING THE NEWSPAPER OR WATCHING TELEVISION: 1
SUM OF ALL RESPONSES TO PHQ QUESTIONS 1-9: 3
5. POOR APPETITE OR OVEREATING: 0
3. TROUBLE FALLING OR STAYING ASLEEP: 1
8. MOVING OR SPEAKING SO SLOWLY THAT OTHER PEOPLE COULD HAVE NOTICED. OR THE OPPOSITE, BEING SO FIGETY OR RESTLESS THAT YOU HAVE BEEN MOVING AROUND A LOT MORE THAN USUAL: 0
2. FEELING DOWN, DEPRESSED OR HOPELESS: 0
SUM OF ALL RESPONSES TO PHQ9 QUESTIONS 1 & 2: 0

## 2023-01-24 ASSESSMENT — ENCOUNTER SYMPTOMS
VOMITING: 0
COUGH: 0
ABDOMINAL PAIN: 0
TROUBLE SWALLOWING: 0
NAUSEA: 0
SORE THROAT: 0
CONSTIPATION: 0
DIARRHEA: 0

## 2023-01-24 ASSESSMENT — SOCIAL DETERMINANTS OF HEALTH (SDOH): HOW HARD IS IT FOR YOU TO PAY FOR THE VERY BASICS LIKE FOOD, HOUSING, MEDICAL CARE, AND HEATING?: NOT HARD AT ALL

## 2023-01-24 NOTE — PROGRESS NOTES
1/25/2023    Marco A Doshi is a 63 y.o. femalehere for:    HPI:    Here for right sided flank pain  Started week ago  Reports some urinary burning   No fever no chills   Minimal discharge - no specific color of discharge  No itch   Not sexually active at this time  No new meds or vaginal discharge   hx of nephrolithiasis : 2018   She required flmoax at that time  No nausea or vomiting  No hematuria       BP Readings from Last 3 Encounters:   01/24/23 (!) 99/57   10/21/22 102/67   09/16/22 114/69     Current Outpatient Medications   Medication Sig Dispense Refill    atorvastatin (LIPITOR) 40 MG tablet TAKE 1 TABLET BY MOUTH DAILY 30 tablet 5    traZODone (DESYREL) 50 MG tablet TAKE 1 TABLET BY MOUTH EVERY NIGHT 30 tablet 1    escitalopram (LEXAPRO) 20 MG tablet TAKE 1 TABLET BY MOUTH EVERY MORNING 30 tablet 1    Teriparatide, Recombinant, (FORTEO) 600 MCG/2.4ML SOPN injection       apixaban (ELIQUIS) 5 MG TABS tablet TAKE 1 TABLET BY MOUTH TWICE DAILY 60 tablet 2    metoprolol succinate (TOPROL XL) 25 MG extended release tablet TAKE 1 TABLET BY MOUTH EVERY EVENING WITH DINNER (Patient taking differently: Take 25 mg by mouth daily as needed) 30 tablet 11    hydroCHLOROthiazide (MICROZIDE) 12.5 MG capsule TAKE 1 CAPSULE BY MOUTH EVERY DAY      Cholecalciferol (VITAMIN D3) 50 MCG (2000 UT) CAPS Take 2,000 Units by mouth      folic acid (FOLVITE) 1 MG tablet Take 1 mg by mouth daily      omeprazole (PRILOSEC) 40 MG delayed release capsule Take 40 mg by mouth daily Take 1 tablet by mouth daily      hydroxychloroquine (PLAQUENIL) 200 MG tablet Take 200 mg by mouth 2 times daily Take 1 tablet by mouth twice a day      methotrexate (RHEUMATREX) 2.5 MG chemo tablet Take 2.5 mg by mouth once a week Take 6 tablets by mouth weekly      famotidine (PEPCID) 40 MG tablet Take 1 tablet by mouth every evening (Patient not taking: Reported on 1/24/2023) 30 tablet 3     No current facility-administered medications for this visit. Allergies   Allergen Reactions    Morphine Anaphylaxis     Rapid drop in BP     Phenergan [Promethazine] Other (See Comments)     Increased anxity    Ativan [Lorazepam] Anxiety    Penicillins Rash       Past Medical & Surgical History:      Diagnosis Date    Anxiety and depression     Atrial fibrillation (HCC)     Fried's esophagus with dysplasia     COVID-19 04/04/2021    Hx of deep venous thrombosis     Lupus ([facility] [address]     Stroke ([facility] [address]      No past surgical history on file. Family History:      Problem Relation Age of Onset    Stroke Mother         minor    Colon Polyps Mother         precanceous    Heart Attack Father 58        ortic aneurism as well    Colon Cancer Brother 40    Deep Vein Thrombosis Brother         And PE    Stroke Brother     Colon Cancer Paternal Grandmother 80    Colon Cancer Brother 39    Deep Vein Thrombosis Brother     Seizures Brother        Social History:  Social History     Tobacco Use    Smoking status: Every Day     Packs/day: 0.25     Years: 40.00     Pack years: 10.00     Types: Cigarettes     Start date: 1980    Smokeless tobacco: Never   Substance Use Topics    Alcohol use: Yes     Comment: very rarely       Immunization History   Administered Date(s) Administered    Influenza, FLUARIX, FLULAVAL, FLUZONE (age 10 mo+) AND AFLURIA, (age 1 y+), PF, 0.5mL 03/09/2021       Review of Systems   Constitutional:  Negative for chills and fever. HENT:  Negative for congestion, sore throat and trouble swallowing. Respiratory:  Negative for cough. Cardiovascular:  Negative for chest pain and leg swelling. Gastrointestinal:  Negative for abdominal pain, constipation, diarrhea, nausea and vomiting. Genitourinary:  Positive for dysuria and flank pain. Negative for difficulty urinating. Musculoskeletal:  Negative for arthralgias and myalgias. Skin:  Negative for rash and wound. Neurological:  Negative for dizziness and headaches.    Psychiatric/Behavioral:  Negative for agitation. VS:  BP (!) 99/57   Pulse 58   Temp 97.2 °F (36.2 °C) (Temporal)   Ht 5' 8\" (1.727 m)   Wt 164 lb (74.4 kg)   SpO2 96%   BMI 24.94 kg/m²     Physical Exam  Constitutional:       Appearance: Normal appearance. HENT:      Head: Normocephalic. Nose: Nose normal. No rhinorrhea. Eyes:      General: No scleral icterus. Conjunctiva/sclera: Conjunctivae normal.   Cardiovascular:      Rate and Rhythm: Normal rate and regular rhythm. Pulses: Normal pulses. Heart sounds: Normal heart sounds. No murmur heard. Pulmonary:      Breath sounds: Normal breath sounds. No wheezing, rhonchi or rales. Abdominal:      General: Abdomen is flat. Bowel sounds are normal.      Tenderness: There is right CVA tenderness. Musculoskeletal:      Right lower leg: No edema. Left lower leg: No edema. Skin:     General: Skin is warm. Findings: No rash. Neurological:      General: No focal deficit present. Mental Status: She is alert. Assessment/Plan:  1. Acute cystitis without hematuria  UA showed trace blood   No signs of leuk and nitrite  Will hold off treating with abx at this time , will send UC at this time  - POCT Urinalysis no Micro  - Culture, Urine; Future  - CT ABDOMEN PELVIS WO CONTRAST Additional Contrast? None; Future  - Basic Metabolic Panel; Future    2  Right flank tenderness  Given hx of nephrolithiasis and flank tenderness , pt with hx of kidney stones in 2018   We will bmp to check for kidney function   Will also get stat CT to r/o kidney stones and hydronephrosis  - Basic Metabolic Panel; Future  -- CT ABDOMEN PELVIS WO CONTRAST Additional Contrast? None; Future  - Basic Metabolic Panel; Future    Follow up:  2 days     Patient agrees with the above stated plan. Sarah Ochoa received counseling on the following healthy behaviors: nutrition, exercise, and medication adherence.     Veronica Mcgrath MD  PGY-3 Family Medicine

## 2023-01-24 NOTE — PROGRESS NOTES
S: 63 y.o. female with   Chief Complaint   Patient presents with    3 Month Follow-Up     Anxiety     Lower Back Pain     Burning when urinating        Dysuria - has hx renal stones. She is having R flank pains. Pain is moderate. O: VS:  height is 5' 8\" (1.727 m) and weight is 164 lb (74.4 kg). Her temporal temperature is 97.2 °F (36.2 °C). Her blood pressure is 99/57 (abnormal) and her pulse is 58. Her oxygen saturation is 96%. BP Readings from Last 3 Encounters:   01/24/23 (!) 99/57   10/21/22 102/67   09/16/22 114/69     See resident note      Impression/Plan:   1) Dysuria with R flank pains - will check CT. Health Maintenance Due   Topic Date Due    COVID-19 Vaccine (1) Never done    Pneumococcal 0-64 years Vaccine (1 - PCV) Never done    HIV screen  Never done    Hepatitis C screen  Never done    DTaP/Tdap/Td vaccine (1 - Tdap) Never done    Breast cancer screen  Never done    Shingles vaccine (1 of 2) Never done    Lipids  03/09/2022    Flu vaccine (1) 08/01/2022    A1C test (Diabetic or Prediabetic)  08/11/2022         Attending Physician Statement  I have discussed the case, including pertinent history and exam findings with the resident. I agree with the documented assessment and plan.       Aliyah Barroso MD

## 2023-01-26 ENCOUNTER — OFFICE VISIT (OUTPATIENT)
Dept: FAMILY MEDICINE CLINIC | Age: 63
End: 2023-01-26
Payer: MEDICAID

## 2023-01-26 VITALS
BODY MASS INDEX: 24.4 KG/M2 | SYSTOLIC BLOOD PRESSURE: 98 MMHG | OXYGEN SATURATION: 98 % | HEIGHT: 68 IN | RESPIRATION RATE: 16 BRPM | DIASTOLIC BLOOD PRESSURE: 54 MMHG | WEIGHT: 161 LBS | TEMPERATURE: 97.5 F | HEART RATE: 59 BPM

## 2023-01-26 DIAGNOSIS — I87.9 DISORDER OF HEPATIC PORTAL VEIN: ICD-10-CM

## 2023-01-26 DIAGNOSIS — K76.89 HEPATIC CYST: ICD-10-CM

## 2023-01-26 DIAGNOSIS — F32.A ANXIETY AND DEPRESSION: ICD-10-CM

## 2023-01-26 DIAGNOSIS — F41.9 ANXIETY AND DEPRESSION: ICD-10-CM

## 2023-01-26 DIAGNOSIS — R10.819 RIGHT FLANK TENDERNESS: Primary | ICD-10-CM

## 2023-01-26 PROBLEM — N20.0 KIDNEY STONE ON LEFT SIDE: Status: ACTIVE | Noted: 2023-01-26

## 2023-01-26 PROCEDURE — 99214 OFFICE O/P EST MOD 30 MIN: CPT | Performed by: FAMILY MEDICINE

## 2023-01-26 RX ORDER — ESCITALOPRAM OXALATE 20 MG/1
TABLET ORAL
Qty: 30 TABLET | Refills: 1 | Status: SHIPPED | OUTPATIENT
Start: 2023-01-26

## 2023-01-26 RX ORDER — ESCITALOPRAM OXALATE 20 MG/1
TABLET ORAL
Qty: 30 TABLET | Refills: 1 | OUTPATIENT
Start: 2023-01-26

## 2023-01-26 RX ORDER — TAMSULOSIN HYDROCHLORIDE 0.4 MG/1
0.4 CAPSULE ORAL DAILY
Qty: 90 CAPSULE | Refills: 1 | Status: SHIPPED | OUTPATIENT
Start: 2023-01-26

## 2023-01-26 ASSESSMENT — ENCOUNTER SYMPTOMS
VOMITING: 0
COUGH: 0
TROUBLE SWALLOWING: 0
CONSTIPATION: 0
NAUSEA: 0
ABDOMINAL PAIN: 0
DIARRHEA: 0
SORE THROAT: 0

## 2023-01-26 NOTE — PROGRESS NOTES
[facility]  Precepting Note    Subjective: Follow up on flank pain  -has L sided stone  -no hydro  -normal kidney function  -portal vein dilation and hepatic cyst present    ROS otherwise negative     Past medical, surgical, family and social history were reviewed, non-contributory, and unchanged unless otherwise stated. Objective:    BP (!) 98/54   Pulse 59   Temp 97.5 °F (36.4 °C) (Temporal)   Resp 16   Ht 5' 8\" (1.727 m)   Wt 161 lb (73 kg)   SpO2 98%   BMI 24.48 kg/m²     Exam is as noted by resident with the following changes, additions or corrections:    General:  NAD; alert & oriented x 3   Heart:  RRR, no murmurs, gallops, or rubs. Lungs:  CTA bilaterally, no wheeze, rales or rhonchi  Abd: bowel sounds present, nontender, nondistended, no masses  Extrem:  No clubbing, cyanosis, or edema    Assessment/Plan:  L flank pain, trial of flomax  Cysts and possible portal HTN-obtain u/s  Decrease BP meds while on flomax     Attending Physician Statement  I have reviewed the chart, including any radiology or labs. I have discussed the case, including pertinent history and exam findings with the resident. I agree with the assessment, plan and orders as documented by the resident. Please refer to the resident note for additional information.       Electronically signed by Johny Rodriguez MD on 1/26/2023 at 11:26 AM

## 2023-01-26 NOTE — PROGRESS NOTES
[facility]  Department of Family Medicine  Family Medicine Residency Program      Patient: Elena Degroot 63 y.o. female     Date of Service: 1/26/23      Chief complaint: No chief complaint on file. HISTORY OF PRESENTING ILLNESS     63 y.o. female presented to the clinic today for f/u on flank pain, acute cystitis. Hold abx until Ucx    CT unremarkable. RUQ ordered per CT recommendation. HPI    Health Maintenance:  Health Maintenance Due   Topic Date Due    COVID-19 Vaccine (1) Never done    Pneumococcal 0-64 years Vaccine (1 - PCV) Never done    HIV screen  Never done    Hepatitis C screen  Never done    DTaP/Tdap/Td vaccine (1 - Tdap) Never done    Breast cancer screen  Never done    Shingles vaccine (1 of 2) Never done    Lipids  03/09/2022    Flu vaccine (1) 08/01/2022    A1C test (Diabetic or Prediabetic)  08/11/2022     Allergies:    Morphine, Phenergan [promethazine], Ativan [lorazepam], and Penicillins  Past Medical History:      Diagnosis Date    Anxiety and depression     Atrial fibrillation ([facility] [address]     Fried's esophagus with dysplasia     COVID-19 04/04/2021    Hx of deep venous thrombosis     Lupus (HCC)     Stroke [facility])      Past Surgical History:    No past surgical history on file.   Social History:   Social History     Socioeconomic History    Marital status:      Spouse name: Not on file    Number of children: Not on file    Years of education: Not on file    Highest education level: Not on file   Occupational History    Not on file   Tobacco Use    Smoking status: Every Day     Packs/day: 0.25     Years: 40.00     Pack years: 10.00     Types: Cigarettes     Start date: 38    Smokeless tobacco: Never   Vaping Use    Vaping Use: Never used   Substance and Sexual Activity    Alcohol use: Yes     Comment: very rarely    Drug use: Not Currently    Sexual activity: Not Currently   Other Topics Concern    Not on file   Social History Narrative    Not on file     Social Determinants of Health     Financial Resource Strain: Low Risk     Difficulty of Paying Living Expenses: Not hard at all   Food Insecurity: No Food Insecurity    Worried About Running Out of Food in the Last Year: Never true    Ran Out of Food in the Last Year: Never true   Transportation Needs: Not on file   Physical Activity: Not on file   Stress: Not on file   Social Connections: Not on file   Intimate Partner Violence: Not on file   Housing Stability: Not on file      Family History:       Problem Relation Age of Onset    Stroke Mother         minor    Colon Polyps Mother         precanceous    Heart Attack Father 58        ortic aneurism as well    Colon Cancer Brother 40    Deep Vein Thrombosis Brother         And PE    Stroke Brother     Colon Cancer Paternal Grandmother 80    Colon Cancer Brother 45    Deep Vein Thrombosis Brother     Seizures Brother      Review of Systems:   Review of Systems      PHYSICAL EXAM   Vitals: There were no vitals taken for this visit. Physical Exam      ASSESSMENT AND PLAN     There are no diagnoses linked to this encounter. Counseled regarding above diagnosis, including possible risks and complications, especially if left uncontrolled. Counseled regarding the possible side effects, risks, benefits and alternatives to treatment; patient and/or guardian verbalizes understanding, agrees, feels comfortable with and wishes to proceed with above treatment plan. Call or go to ED immediately if symptoms worsen or persist. Advised patient to call with any new medication issues, and, as applicable, read all Rx info from pharmacy to assure aware of all possible risks and side effects of medication before taking. Patient and/or guardian given opportunity to ask questions/raise concerns. The patient verbalized comfort and understanding of instructions. I encourage further reading and education about your health conditions. Information on many health conditions is provided by the American Academy of Family Physicians: https://familydoctor. org/  Please bring any questions to me at your next visit. Current Outpatient Medications   Medication Sig Dispense Refill    atorvastatin (LIPITOR) 40 MG tablet TAKE 1 TABLET BY MOUTH DAILY 30 tablet 5    traZODone (DESYREL) 50 MG tablet TAKE 1 TABLET BY MOUTH EVERY NIGHT 30 tablet 1    escitalopram (LEXAPRO) 20 MG tablet TAKE 1 TABLET BY MOUTH EVERY MORNING 30 tablet 1    Teriparatide, Recombinant, (FORTEO) 600 MCG/2.4ML SOPN injection       apixaban (ELIQUIS) 5 MG TABS tablet TAKE 1 TABLET BY MOUTH TWICE DAILY 60 tablet 2    metoprolol succinate (TOPROL XL) 25 MG extended release tablet TAKE 1 TABLET BY MOUTH EVERY EVENING WITH DINNER (Patient taking differently: Take 25 mg by mouth daily as needed) 30 tablet 11    hydroCHLOROthiazide (MICROZIDE) 12.5 MG capsule TAKE 1 CAPSULE BY MOUTH EVERY DAY      famotidine (PEPCID) 40 MG tablet Take 1 tablet by mouth every evening (Patient not taking: Reported on 1/24/2023) 30 tablet 3    Cholecalciferol (VITAMIN D3) 50 MCG (2000 UT) CAPS Take 2,000 Units by mouth      folic acid (FOLVITE) 1 MG tablet Take 1 mg by mouth daily      omeprazole (PRILOSEC) 40 MG delayed release capsule Take 40 mg by mouth daily Take 1 tablet by mouth daily      hydroxychloroquine (PLAQUENIL) 200 MG tablet Take 200 mg by mouth 2 times daily Take 1 tablet by mouth twice a day      methotrexate (RHEUMATREX) 2.5 MG chemo tablet Take 2.5 mg by mouth once a week Take 6 tablets by mouth weekly       No current facility-administered medications for this visit. Return to Office: No follow-ups on file. This document may have been prepared at least partially through the use of voice recognition software. Although effort is taken to assure the accuracy of this document, it is possible that grammatical, syntax,  or spelling errors may occur.     Kurtis Braga MD  Family Medicine Resident PGY-2  1/26/2023

## 2023-01-26 NOTE — PROGRESS NOTES
1/26/2023    Muriel Hodgkin is a 63 y.o. femalehere for:    HPI:      Here for right flank tenderness fu   CT abdomen showed nonobstructing left kidney stone. It also showed portal vein dilation and hepatic cyst.  Normal BMP  Still having minimal flank pain on left and right side  She has not been taking any medications for pain  No new fevers or chills.     History of appendectomy and cholecystectomy  Does has a history of pancreatitis when she had stent placed in the pancreatic duct      BP Readings from Last 3 Encounters:   01/26/23 (!) 98/54   01/24/23 (!) 99/57   10/21/22 102/67     Current Outpatient Medications   Medication Sig Dispense Refill    escitalopram (LEXAPRO) 20 MG tablet TAKE 1 TABLET BY MOUTH EVERY MORNING 30 tablet 1    tamsulosin (FLOMAX) 0.4 MG capsule Take 1 capsule by mouth daily 90 capsule 1    atorvastatin (LIPITOR) 40 MG tablet TAKE 1 TABLET BY MOUTH DAILY 30 tablet 5    traZODone (DESYREL) 50 MG tablet TAKE 1 TABLET BY MOUTH EVERY NIGHT 30 tablet 1    Teriparatide, Recombinant, (FORTEO) 600 MCG/2.4ML SOPN injection       apixaban (ELIQUIS) 5 MG TABS tablet TAKE 1 TABLET BY MOUTH TWICE DAILY 60 tablet 2    metoprolol succinate (TOPROL XL) 25 MG extended release tablet TAKE 1 TABLET BY MOUTH EVERY EVENING WITH DINNER (Patient taking differently: Take 25 mg by mouth daily as needed) 30 tablet 11    famotidine (PEPCID) 40 MG tablet Take 1 tablet by mouth every evening 30 tablet 3    Cholecalciferol (VITAMIN D3) 50 MCG (2000 UT) CAPS Take 2,000 Units by mouth      folic acid (FOLVITE) 1 MG tablet Take 1 mg by mouth daily      omeprazole (PRILOSEC) 40 MG delayed release capsule Take 40 mg by mouth daily Take 1 tablet by mouth daily      hydroxychloroquine (PLAQUENIL) 200 MG tablet Take 200 mg by mouth 2 times daily Take 1 tablet by mouth twice a day      methotrexate (RHEUMATREX) 2.5 MG chemo tablet Take 2.5 mg by mouth once a week Take 6 tablets by mouth weekly       No current facility-administered medications for this visit. Allergies   Allergen Reactions    Morphine Anaphylaxis     Rapid drop in BP     Phenergan [Promethazine] Other (See Comments)     Increased anxity    Ativan [Lorazepam] Anxiety    Penicillins Rash       Past Medical & Surgical History:      Diagnosis Date    Anxiety and depression     Atrial fibrillation (HCC)     Fried's esophagus with dysplasia     COVID-19 04/04/2021    Hx of deep venous thrombosis     Lupus ([facility] [address]     Stroke ([facility] [address]      No past surgical history on file. Family History:      Problem Relation Age of Onset    Stroke Mother         minor    Colon Polyps Mother         precanceous    Heart Attack Father 58        ortic aneurism as well    Colon Cancer Brother 40    Deep Vein Thrombosis Brother         And PE    Stroke Brother     Colon Cancer Paternal Grandmother 80    Colon Cancer Brother 39    Deep Vein Thrombosis Brother     Seizures Brother        Social History:  Social History     Tobacco Use    Smoking status: Every Day     Packs/day: 0.25     Years: 40.00     Pack years: 10.00     Types: Cigarettes     Start date: 1980    Smokeless tobacco: Never   Substance Use Topics    Alcohol use: Yes     Comment: very rarely       Immunization History   Administered Date(s) Administered    Influenza, FLUARIX, FLULAVAL, FLUZONE (age 10 mo+) AND AFLURIA, (age 1 y+), PF, 0.5mL 03/09/2021       Review of Systems   Constitutional:  Negative for chills and fever. HENT:  Negative for congestion, sore throat and trouble swallowing. Respiratory:  Negative for cough. Cardiovascular:  Negative for chest pain and leg swelling. Gastrointestinal:  Negative for abdominal pain, constipation, diarrhea, nausea and vomiting. Genitourinary:  Positive for flank pain. Negative for difficulty urinating. Musculoskeletal:  Negative for arthralgias and myalgias. Skin:  Negative for rash and wound. Neurological:  Negative for dizziness and headaches. Psychiatric/Behavioral:  Negative for agitation. VS:  BP (!) 98/54   Pulse 59   Temp 97.5 °F (36.4 °C) (Temporal)   Resp 16   Ht 5' 8\" (1.727 m)   Wt 161 lb (73 kg)   SpO2 98%   BMI 24.48 kg/m²     Physical Exam  Constitutional:       Appearance: Normal appearance. HENT:      Head: Normocephalic. Nose: Nose normal. No rhinorrhea. Eyes:      General: No scleral icterus. Conjunctiva/sclera: Conjunctivae normal.   Cardiovascular:      Rate and Rhythm: Normal rate and regular rhythm. Pulses: Normal pulses. Heart sounds: Normal heart sounds. No murmur heard. Pulmonary:      Breath sounds: Normal breath sounds. No wheezing, rhonchi or rales. Abdominal:      General: Abdomen is flat. Bowel sounds are normal.      Tenderness: There is right CVA tenderness and left CVA tenderness. Musculoskeletal:      Right lower leg: No edema. Left lower leg: No edema. Skin:     General: Skin is warm. Findings: No rash. Neurological:      General: No focal deficit present. Mental Status: She is alert. Assessment/Plan:    1 Left nephrolithiasis  At this time we will start patient on Flomax. CT scan reviewed which showed nonobstructing left kidney stone. BMP is reassuring. Patient does has low blood pressure in the clinic, patient is on hydrochlorothiazide at home. At this time we will discontinue hydrochlorothiazide since Flomax is now bring home blood pressure further down. Patient understood the plan. Discussed with the patient any new symptoms or any worsening concerns she needs to be evaluated sooner. 2. Hepatic cyst  - Elizabeth Merida MD, Gastroenterology, [facility] [email or web address] [facility]; Future    3.  Disorder of hepatic portal vein  This time we will refer patient to GI due to hepatic portal vein dilation  - Elizabeth Merida MD, Gastroenterology, [facility] [email or web address] [facility]; Future      Follow up: 2 months    Patient agrees with the above stated plan.    Sirena Lantigua received counseling on the following healthy behaviors: nutrition, exercise, and medication adherence.     Ariane Guerin MD  PGY-3 Family Medicine

## 2023-01-27 DIAGNOSIS — F41.9 ANXIETY AND DEPRESSION: ICD-10-CM

## 2023-01-27 DIAGNOSIS — F32.A ANXIETY AND DEPRESSION: ICD-10-CM

## 2023-01-27 LAB — URINE CULTURE, ROUTINE: NORMAL

## 2023-01-30 RX ORDER — TRAZODONE HYDROCHLORIDE 50 MG/1
TABLET ORAL
Qty: 30 TABLET | Refills: 1 | Status: SHIPPED | OUTPATIENT
Start: 2023-01-30

## 2023-02-13 ENCOUNTER — TELEPHONE (OUTPATIENT)
Dept: FAMILY MEDICINE CLINIC | Age: 63
End: 2023-02-13

## 2023-02-13 NOTE — TELEPHONE ENCOUNTER
Patient had an order for US of the gall bladder which was cancelled. A new order needs put in for patient to schedule for liver.

## 2023-02-14 DIAGNOSIS — R10.819 RIGHT FLANK TENDERNESS: Primary | ICD-10-CM

## 2023-02-22 RX ORDER — METOPROLOL SUCCINATE 25 MG/1
TABLET, EXTENDED RELEASE ORAL
Qty: 90 TABLET | Refills: 0 | Status: SHIPPED | OUTPATIENT
Start: 2023-02-22

## 2023-03-12 ENCOUNTER — HOSPITAL ENCOUNTER (OUTPATIENT)
Dept: ULTRASOUND IMAGING | Age: 63
Discharge: HOME | End: 2023-03-12
Payer: MEDICARE

## 2023-03-12 DIAGNOSIS — R10.819 RIGHT FLANK TENDERNESS: ICD-10-CM

## 2023-03-12 PROCEDURE — 76705 ECHO EXAM OF ABDOMEN: CPT

## 2023-03-15 ENCOUNTER — INITIAL CONSULT (OUTPATIENT)
Dept: GASTROENTEROLOGY | Age: 63
End: 2023-03-15
Payer: MEDICARE

## 2023-03-15 VITALS
SYSTOLIC BLOOD PRESSURE: 124 MMHG | WEIGHT: 165 LBS | RESPIRATION RATE: 18 BRPM | DIASTOLIC BLOOD PRESSURE: 78 MMHG | TEMPERATURE: 96.8 F | BODY MASS INDEX: 25.01 KG/M2 | HEIGHT: 68 IN | OXYGEN SATURATION: 98 % | HEART RATE: 65 BPM

## 2023-03-15 DIAGNOSIS — K76.89 HEPATIC CYST: ICD-10-CM

## 2023-03-15 DIAGNOSIS — K83.8 COMMON BILE DUCT DILATION: ICD-10-CM

## 2023-03-15 DIAGNOSIS — R93.3 ABNORMAL FINDING ON GI TRACT IMAGING: ICD-10-CM

## 2023-03-15 DIAGNOSIS — Z79.01 CHRONIC ANTICOAGULATION: ICD-10-CM

## 2023-03-15 DIAGNOSIS — Z86.718 HX OF DEEP VENOUS THROMBOSIS: ICD-10-CM

## 2023-03-15 DIAGNOSIS — Z86.010 HISTORY OF COLON POLYPS: ICD-10-CM

## 2023-03-15 DIAGNOSIS — K22.719 BARRETT'S ESOPHAGUS WITH DYSPLASIA: ICD-10-CM

## 2023-03-15 DIAGNOSIS — I48.20 CHRONIC ATRIAL FIBRILLATION (HCC): Primary | ICD-10-CM

## 2023-03-15 LAB
ALBUMIN SERPL-MCNC: 4.2 G/DL (ref 3.5–5.2)
ALP SERPL-CCNC: 56 U/L (ref 35–104)
ALT SERPL-CCNC: 11 U/L (ref 0–32)
AST SERPL-CCNC: 11 U/L (ref 0–31)
BILIRUB DIRECT SERPL-MCNC: <0.2 MG/DL (ref 0–0.3)
BILIRUB INDIRECT SERPL-MCNC: NORMAL MG/DL (ref 0–1)
BILIRUB SERPL-MCNC: 0.3 MG/DL (ref 0–1.2)
PROT SERPL-MCNC: 7.1 G/DL (ref 6.4–8.3)

## 2023-03-15 PROCEDURE — G8427 DOCREV CUR MEDS BY ELIG CLIN: HCPCS | Performed by: STUDENT IN AN ORGANIZED HEALTH CARE EDUCATION/TRAINING PROGRAM

## 2023-03-15 PROCEDURE — 4004F PT TOBACCO SCREEN RCVD TLK: CPT | Performed by: STUDENT IN AN ORGANIZED HEALTH CARE EDUCATION/TRAINING PROGRAM

## 2023-03-15 PROCEDURE — G8484 FLU IMMUNIZE NO ADMIN: HCPCS | Performed by: STUDENT IN AN ORGANIZED HEALTH CARE EDUCATION/TRAINING PROGRAM

## 2023-03-15 PROCEDURE — G8419 CALC BMI OUT NRM PARAM NOF/U: HCPCS | Performed by: STUDENT IN AN ORGANIZED HEALTH CARE EDUCATION/TRAINING PROGRAM

## 2023-03-15 PROCEDURE — 99205 OFFICE O/P NEW HI 60 MIN: CPT | Performed by: STUDENT IN AN ORGANIZED HEALTH CARE EDUCATION/TRAINING PROGRAM

## 2023-03-15 PROCEDURE — 3017F COLORECTAL CA SCREEN DOC REV: CPT | Performed by: STUDENT IN AN ORGANIZED HEALTH CARE EDUCATION/TRAINING PROGRAM

## 2023-03-15 RX ORDER — SODIUM, POTASSIUM,MAG SULFATES 17.5-3.13G
1 SOLUTION, RECONSTITUTED, ORAL ORAL ONCE
Qty: 1 EACH | Refills: 0 | Status: SHIPPED | OUTPATIENT
Start: 2023-03-15 | End: 2023-03-15

## 2023-03-21 DIAGNOSIS — F32.A ANXIETY AND DEPRESSION: ICD-10-CM

## 2023-03-21 DIAGNOSIS — F41.9 ANXIETY AND DEPRESSION: ICD-10-CM

## 2023-03-22 RX ORDER — ESCITALOPRAM OXALATE 20 MG/1
TABLET ORAL
Qty: 30 TABLET | Refills: 1 | Status: SHIPPED | OUTPATIENT
Start: 2023-03-22

## 2023-03-27 NOTE — PROGRESS NOTES
Vaping Use    Vaping Use: Never used   Substance and Sexual Activity    Alcohol use: Yes     Comment: very rarely    Drug use: Not Currently    Sexual activity: Not Currently   Other Topics Concern    Not on file   Social History Narrative    Not on file     Social Determinants of Health     Financial Resource Strain: Low Risk     Difficulty of Paying Living Expenses: Not hard at all   Food Insecurity: No Food Insecurity    Worried About Running Out of Food in the Last Year: Never true    Ran Out of Food in the Last Year: Never true   Transportation Needs: Not on file   Physical Activity: Not on file   Stress: Not on file   Social Connections: Not on file   Intimate Partner Violence: Not on file   Housing Stability: Not on file        History reviewed. No pertinent surgical history. Current Outpatient Medications   Medication Sig Dispense Refill    escitalopram (LEXAPRO) 20 MG tablet TAKE 1 TABLET BY MOUTH EVERY MORNING 30 tablet 1    metoprolol succinate (TOPROL XL) 25 MG extended release tablet TAKE 1 TABLET BY MOUTH DAILY.  90 tablet 0    traZODone (DESYREL) 50 MG tablet TAKE 1 TABLET BY MOUTH EVERY NIGHT 30 tablet 1    atorvastatin (LIPITOR) 40 MG tablet TAKE 1 TABLET BY MOUTH DAILY 30 tablet 5    apixaban (ELIQUIS) 5 MG TABS tablet TAKE 1 TABLET BY MOUTH TWICE DAILY 60 tablet 2    Cholecalciferol (VITAMIN D3) 50 MCG (2000 UT) CAPS Take 2,000 Units by mouth      folic acid (FOLVITE) 1 MG tablet Take 1 mg by mouth daily      omeprazole (PRILOSEC) 40 MG delayed release capsule Take 40 mg by mouth daily Take 1 tablet by mouth daily      hydroxychloroquine (PLAQUENIL) 200 MG tablet Take 200 mg by mouth 2 times daily Take 1 tablet by mouth twice a day      methotrexate (RHEUMATREX) 2.5 MG chemo tablet Take 2.5 mg by mouth once a week Take 6 tablets by mouth weekly      tamsulosin (FLOMAX) 0.4 MG capsule Take 1 capsule by mouth daily (Patient not taking: Reported on 3/28/2023) 90 capsule 1    Teriparatide,

## 2023-03-28 ENCOUNTER — OFFICE VISIT (OUTPATIENT)
Dept: NON INVASIVE DIAGNOSTICS | Age: 63
End: 2023-03-28
Payer: MEDICARE

## 2023-03-28 ENCOUNTER — HOSPITAL ENCOUNTER (OUTPATIENT)
Dept: MRI IMAGING | Age: 63
Discharge: HOME | End: 2023-03-30
Payer: MEDICARE

## 2023-03-28 VITALS
BODY MASS INDEX: 25.31 KG/M2 | DIASTOLIC BLOOD PRESSURE: 68 MMHG | HEIGHT: 68 IN | HEART RATE: 59 BPM | SYSTOLIC BLOOD PRESSURE: 112 MMHG | RESPIRATION RATE: 18 BRPM | WEIGHT: 167 LBS

## 2023-03-28 DIAGNOSIS — K83.8 COMMON BILE DUCT DILATION: ICD-10-CM

## 2023-03-28 DIAGNOSIS — K76.89 HEPATIC CYST: ICD-10-CM

## 2023-03-28 DIAGNOSIS — R93.3 ABNORMAL FINDING ON GI TRACT IMAGING: ICD-10-CM

## 2023-03-28 DIAGNOSIS — I48.91 ATRIAL FIBRILLATION, UNSPECIFIED TYPE (HCC): Primary | ICD-10-CM

## 2023-03-28 PROCEDURE — 99215 OFFICE O/P EST HI 40 MIN: CPT | Performed by: INTERNAL MEDICINE

## 2023-03-28 PROCEDURE — 93000 ELECTROCARDIOGRAM COMPLETE: CPT | Performed by: INTERNAL MEDICINE

## 2023-03-28 PROCEDURE — 74183 MRI ABD W/O CNTR FLWD CNTR: CPT

## 2023-03-28 PROCEDURE — G8419 CALC BMI OUT NRM PARAM NOF/U: HCPCS | Performed by: INTERNAL MEDICINE

## 2023-03-28 PROCEDURE — 3017F COLORECTAL CA SCREEN DOC REV: CPT | Performed by: INTERNAL MEDICINE

## 2023-03-28 PROCEDURE — G8427 DOCREV CUR MEDS BY ELIG CLIN: HCPCS | Performed by: INTERNAL MEDICINE

## 2023-03-28 PROCEDURE — A9579 GAD-BASE MR CONTRAST NOS,1ML: HCPCS | Performed by: RADIOLOGY

## 2023-03-28 PROCEDURE — 6360000004 HC RX CONTRAST MEDICATION: Performed by: RADIOLOGY

## 2023-03-28 PROCEDURE — 4004F PT TOBACCO SCREEN RCVD TLK: CPT | Performed by: INTERNAL MEDICINE

## 2023-03-28 PROCEDURE — G8484 FLU IMMUNIZE NO ADMIN: HCPCS | Performed by: INTERNAL MEDICINE

## 2023-03-28 RX ADMIN — GADOTERIDOL 15 ML: 279.3 INJECTION, SOLUTION INTRAVENOUS at 09:42

## 2023-04-28 ENCOUNTER — TELEPHONE (OUTPATIENT)
Dept: GASTROENTEROLOGY | Age: 63
End: 2023-04-28

## 2023-04-28 RX ORDER — SODIUM, POTASSIUM,MAG SULFATES 17.5-3.13G
1 SOLUTION, RECONSTITUTED, ORAL ORAL ONCE
Qty: 1 EACH | Refills: 0 | Status: SHIPPED | OUTPATIENT
Start: 2023-04-28 | End: 2023-04-28

## 2023-04-28 NOTE — TELEPHONE ENCOUNTER
Patient called regarding date and time if EUS/EGD/colonoscopy    Informed patient that her procedures are scheduled for 8/15/23 at [facility] at [address] 7 AM arrival time    Suprep sent to pharmacy.   Instructions will be sent on crobot

## 2023-05-22 RX ORDER — METOPROLOL SUCCINATE 25 MG/1
TABLET, EXTENDED RELEASE ORAL
Qty: 90 TABLET | Refills: 3 | Status: SHIPPED | OUTPATIENT
Start: 2023-05-22

## 2023-07-28 ENCOUNTER — PREP FOR PROCEDURE (OUTPATIENT)
Dept: GASTROENTEROLOGY | Age: 63
End: 2023-07-28

## 2023-07-28 ENCOUNTER — TELEPHONE (OUTPATIENT)
Dept: GASTROENTEROLOGY | Age: 63
End: 2023-07-28

## 2023-07-28 PROBLEM — Z86.010 PERSONAL HISTORY OF COLONIC POLYPS: Status: ACTIVE | Noted: 2023-07-28

## 2023-07-28 PROBLEM — Z86.0100 PERSONAL HISTORY OF COLONIC POLYPS: Status: ACTIVE | Noted: 2023-07-28

## 2023-07-28 RX ORDER — ATORVASTATIN CALCIUM 40 MG/1
40 TABLET, FILM COATED ORAL DAILY
Qty: 30 TABLET | Refills: 5 | Status: SHIPPED | OUTPATIENT
Start: 2023-07-28

## 2023-07-28 RX ORDER — TAMSULOSIN HYDROCHLORIDE 0.4 MG/1
0.4 CAPSULE ORAL DAILY
Qty: 30 CAPSULE | Refills: 0 | Status: SHIPPED | OUTPATIENT
Start: 2023-07-28

## 2023-07-28 NOTE — TELEPHONE ENCOUNTER
Last Appointment   1/26/2023  Next Appointment  Visit date not found    Follow up: 2 months    Sent [employer] message advising to schedule within 30 days; pended 30 with no refills

## 2023-07-28 NOTE — TELEPHONE ENCOUNTER
Prior Authorization Form:      DEMOGRAPHICS:                     Patient Name:  Baudilio Patino  Patient :  1960            Insurance:  Payor: MEDICARE / Plan: MEDICARE PART A AND B / Product Type: *No Product type* /   Insurance ID Number:    Payer/Plan Subscr  Sex Relation Sub. Ins. ID Effective Group Num   1. MEDICARE - ME* JANEEN JACQUES M 1960 Female Self [id number]                                    [address] [address]   2.  [address] [facility] 1960 Female Self [id number]                                    [address]         DIAGNOSIS & PROCEDURE:                       Procedure/Operation: EGD/EUS/Colonoscopy           CPT Code: 04921/76829/57028    Diagnosis:  Fried's Esophagus/Personal Hx of colon polyps/Hepatic Cyst    ICD10 Code: K22.719/Z86.010/K76.89    Location:  [address] [place] [address]    Surgeon:  Dr. Malinda Cui    SCHEDULING INFORMATION:                          Date: 08/15/2023    Time: TBD              Anesthesia:  MAC/TIVA                                                       Status:  Outpatient            Electronically signed by Philip Villasenor MA on 2023 at 1:35 PM

## 2023-07-30 RX ORDER — TAMSULOSIN HYDROCHLORIDE 0.4 MG/1
0.4 CAPSULE ORAL DAILY
Qty: 90 CAPSULE | OUTPATIENT
Start: 2023-07-30

## 2023-08-03 NOTE — PROGRESS NOTES
[address]   PRE-ADMISSION TESTING GENERAL INSTRUCTIONS  PAT Phone Number: [phone]      GENERAL INSTRUCTIONS:    [x] Antibacterial Soap Shower Night before and/or AM of surgery. [x] Follow bowel prep instructions per surgeon. No liquids/jello that are red or purple color. [x] Do not wear makeup, lotions, powders, deodorant. [x] Nothing by mouth after midnight; including gum, candy, mints, or water. [x] You may brush your teeth, gargle, but do NOT swallow water. [x] No tobacco products, illegal drugs, marijuana or alcohol within 24 hours of your surgery. [x] Jewelry or valuables should not be brought to the hospital. All body and/or tongue piercing's must be removed prior to arriving to hospital. No contact lens or hair pins. [x] Arrange transportation with a responsible adult  to and from the hospital. Arrange for someone to be with you for the remainder of the day and for 24 hours after your procedure due to having had anesthesia. -Who will be your  for transportation? Hermelinda Davis,         -Who will be staying with you for 24 hrs after your procedure? Hermelinda Davis,   [x] [employer] card and photo ID. PARKING INSTRUCTIONS:     [x] ARRIVAL DATE  8/15 & TIME   6:15 am:   [x]Surgery time may change, if it does we will call you the business day before your scheduled surgery. [x] Enter into the [employer] [facility] [employer]. Two adults may accompany you. [x] Parking lot '\"I\"  is located on [facility] (the corner of [address] [place] [address]). To enter the lot,you will need to get a parking ticket at the gate . To exit you will be given a free parking voucher. You will need both the ticket and parking voucher to exit the parking lot. One car per patient is allowed to park in this lot. Walk up the front walk to the [facility], the door will be locked an employee will greet you and let you in.        EDUCATION INSTRUCTIONS:

## 2023-08-15 ENCOUNTER — ANESTHESIA EVENT (OUTPATIENT)
Dept: ENDOSCOPY | Age: 63
End: 2023-08-15
Payer: MEDICARE

## 2023-08-15 ENCOUNTER — ANESTHESIA (OUTPATIENT)
Dept: ENDOSCOPY | Age: 63
End: 2023-08-15
Payer: MEDICARE

## 2023-08-15 ENCOUNTER — HOSPITAL ENCOUNTER (OUTPATIENT)
Age: 63
Setting detail: OUTPATIENT SURGERY
Discharge: HOME | End: 2023-08-15
Attending: STUDENT IN AN ORGANIZED HEALTH CARE EDUCATION/TRAINING PROGRAM | Admitting: STUDENT IN AN ORGANIZED HEALTH CARE EDUCATION/TRAINING PROGRAM
Payer: MEDICARE

## 2023-08-15 VITALS
WEIGHT: 172 LBS | SYSTOLIC BLOOD PRESSURE: 123 MMHG | RESPIRATION RATE: 18 BRPM | OXYGEN SATURATION: 96 % | HEIGHT: 68 IN | BODY MASS INDEX: 26.07 KG/M2 | DIASTOLIC BLOOD PRESSURE: 83 MMHG | TEMPERATURE: 97.1 F | HEART RATE: 59 BPM

## 2023-08-15 DIAGNOSIS — K76.89 HEPATIC CYST: ICD-10-CM

## 2023-08-15 DIAGNOSIS — K22.70 BARRETT'S ESOPHAGUS: ICD-10-CM

## 2023-08-15 DIAGNOSIS — Z86.010 PERSONAL HISTORY OF COLONIC POLYPS: ICD-10-CM

## 2023-08-15 PROCEDURE — 7100000010 HC PHASE II RECOVERY - FIRST 15 MIN: Performed by: STUDENT IN AN ORGANIZED HEALTH CARE EDUCATION/TRAINING PROGRAM

## 2023-08-15 PROCEDURE — 2709999900 HC NON-CHARGEABLE SUPPLY: Performed by: STUDENT IN AN ORGANIZED HEALTH CARE EDUCATION/TRAINING PROGRAM

## 2023-08-15 PROCEDURE — 88342 IMHCHEM/IMCYTCHM 1ST ANTB: CPT

## 2023-08-15 PROCEDURE — 3700000001 HC ADD 15 MINUTES (ANESTHESIA): Performed by: STUDENT IN AN ORGANIZED HEALTH CARE EDUCATION/TRAINING PROGRAM

## 2023-08-15 PROCEDURE — 88305 TISSUE EXAM BY PATHOLOGIST: CPT

## 2023-08-15 PROCEDURE — 3609010600 HC COLONOSCOPY POLYPECTOMY SNARE/COLD BIOPSY: Performed by: STUDENT IN AN ORGANIZED HEALTH CARE EDUCATION/TRAINING PROGRAM

## 2023-08-15 PROCEDURE — 6360000002 HC RX W HCPCS: Performed by: NURSE ANESTHETIST, CERTIFIED REGISTERED

## 2023-08-15 PROCEDURE — 6370000000 HC RX 637 (ALT 250 FOR IP): Performed by: STUDENT IN AN ORGANIZED HEALTH CARE EDUCATION/TRAINING PROGRAM

## 2023-08-15 PROCEDURE — 3609018500 HC EGD US SCOPE W/ADJACENT STRUCTURES: Performed by: STUDENT IN AN ORGANIZED HEALTH CARE EDUCATION/TRAINING PROGRAM

## 2023-08-15 PROCEDURE — 7100000011 HC PHASE II RECOVERY - ADDTL 15 MIN: Performed by: STUDENT IN AN ORGANIZED HEALTH CARE EDUCATION/TRAINING PROGRAM

## 2023-08-15 PROCEDURE — 3609012400 HC EGD TRANSORAL BIOPSY SINGLE/MULTIPLE: Performed by: STUDENT IN AN ORGANIZED HEALTH CARE EDUCATION/TRAINING PROGRAM

## 2023-08-15 PROCEDURE — 2580000003 HC RX 258: Performed by: NURSE ANESTHETIST, CERTIFIED REGISTERED

## 2023-08-15 PROCEDURE — C1753 CATH, INTRAVAS ULTRASOUND: HCPCS | Performed by: STUDENT IN AN ORGANIZED HEALTH CARE EDUCATION/TRAINING PROGRAM

## 2023-08-15 PROCEDURE — 3700000000 HC ANESTHESIA ATTENDED CARE: Performed by: STUDENT IN AN ORGANIZED HEALTH CARE EDUCATION/TRAINING PROGRAM

## 2023-08-15 PROCEDURE — 2500000003 HC RX 250 WO HCPCS: Performed by: NURSE ANESTHETIST, CERTIFIED REGISTERED

## 2023-08-15 RX ORDER — PROPOFOL 10 MG/ML
INJECTION, EMULSION INTRAVENOUS PRN
Status: DISCONTINUED | OUTPATIENT
Start: 2023-08-15 | End: 2023-08-15 | Stop reason: SDUPTHER

## 2023-08-15 RX ORDER — ONDANSETRON 4 MG/1
4 TABLET, ORALLY DISINTEGRATING ORAL EVERY 8 HOURS PRN
Status: DISCONTINUED | OUTPATIENT
Start: 2023-08-15 | End: 2023-08-15 | Stop reason: HOSPADM

## 2023-08-15 RX ORDER — SODIUM CHLORIDE 9 MG/ML
INJECTION, SOLUTION INTRAVENOUS CONTINUOUS PRN
Status: DISCONTINUED | OUTPATIENT
Start: 2023-08-15 | End: 2023-08-15 | Stop reason: SDUPTHER

## 2023-08-15 RX ORDER — SODIUM CHLORIDE 9 MG/ML
INJECTION, SOLUTION INTRAVENOUS PRN
Status: DISCONTINUED | OUTPATIENT
Start: 2023-08-15 | End: 2023-08-15 | Stop reason: HOSPADM

## 2023-08-15 RX ORDER — DEXAMETHASONE SODIUM PHOSPHATE 10 MG/ML
INJECTION INTRAMUSCULAR; INTRAVENOUS PRN
Status: DISCONTINUED | OUTPATIENT
Start: 2023-08-15 | End: 2023-08-15 | Stop reason: SDUPTHER

## 2023-08-15 RX ORDER — SIMETHICONE 20 MG/.3ML
EMULSION ORAL PRN
Status: DISCONTINUED | OUTPATIENT
Start: 2023-08-15 | End: 2023-08-15 | Stop reason: ALTCHOICE

## 2023-08-15 RX ORDER — SODIUM CHLORIDE 0.9 % (FLUSH) 0.9 %
5-40 SYRINGE (ML) INJECTION PRN
Status: DISCONTINUED | OUTPATIENT
Start: 2023-08-15 | End: 2023-08-15 | Stop reason: HOSPADM

## 2023-08-15 RX ORDER — ONDANSETRON 2 MG/ML
4 INJECTION INTRAMUSCULAR; INTRAVENOUS EVERY 6 HOURS PRN
Status: DISCONTINUED | OUTPATIENT
Start: 2023-08-15 | End: 2023-08-15 | Stop reason: HOSPADM

## 2023-08-15 RX ORDER — ONDANSETRON 2 MG/ML
INJECTION INTRAMUSCULAR; INTRAVENOUS PRN
Status: DISCONTINUED | OUTPATIENT
Start: 2023-08-15 | End: 2023-08-15 | Stop reason: SDUPTHER

## 2023-08-15 RX ORDER — LIDOCAINE HYDROCHLORIDE 10 MG/ML
INJECTION, SOLUTION INFILTRATION; PERINEURAL PRN
Status: DISCONTINUED | OUTPATIENT
Start: 2023-08-15 | End: 2023-08-15 | Stop reason: SDUPTHER

## 2023-08-15 RX ORDER — FENTANYL CITRATE 50 UG/ML
INJECTION, SOLUTION INTRAMUSCULAR; INTRAVENOUS PRN
Status: DISCONTINUED | OUTPATIENT
Start: 2023-08-15 | End: 2023-08-15 | Stop reason: SDUPTHER

## 2023-08-15 RX ORDER — LIDOCAINE HYDROCHLORIDE 20 MG/ML
INJECTION, SOLUTION INFILTRATION; PERINEURAL PRN
Status: DISCONTINUED | OUTPATIENT
Start: 2023-08-15 | End: 2023-08-15 | Stop reason: SDUPTHER

## 2023-08-15 RX ORDER — SODIUM CHLORIDE 0.9 % (FLUSH) 0.9 %
5-40 SYRINGE (ML) INJECTION EVERY 12 HOURS SCHEDULED
Status: DISCONTINUED | OUTPATIENT
Start: 2023-08-15 | End: 2023-08-15 | Stop reason: HOSPADM

## 2023-08-15 RX ORDER — MIDAZOLAM HYDROCHLORIDE 1 MG/ML
INJECTION INTRAMUSCULAR; INTRAVENOUS PRN
Status: DISCONTINUED | OUTPATIENT
Start: 2023-08-15 | End: 2023-08-15 | Stop reason: SDUPTHER

## 2023-08-15 RX ORDER — GLYCOPYRROLATE 0.2 MG/ML
INJECTION INTRAMUSCULAR; INTRAVENOUS PRN
Status: DISCONTINUED | OUTPATIENT
Start: 2023-08-15 | End: 2023-08-15 | Stop reason: SDUPTHER

## 2023-08-15 RX ADMIN — FENTANYL CITRATE 25 MCG: 50 INJECTION, SOLUTION INTRAMUSCULAR; INTRAVENOUS at 07:59

## 2023-08-15 RX ADMIN — LIDOCAINE HYDROCHLORIDE 20 MG: 10 INJECTION, SOLUTION INFILTRATION; PERINEURAL at 07:49

## 2023-08-15 RX ADMIN — PROPOFOL 50 MG: 10 INJECTION, EMULSION INTRAVENOUS at 07:52

## 2023-08-15 RX ADMIN — PROPOFOL 50 MG: 10 INJECTION, EMULSION INTRAVENOUS at 07:59

## 2023-08-15 RX ADMIN — PROPOFOL 50 MG: 10 INJECTION, EMULSION INTRAVENOUS at 08:20

## 2023-08-15 RX ADMIN — SODIUM CHLORIDE: 9 INJECTION, SOLUTION INTRAVENOUS at 07:28

## 2023-08-15 RX ADMIN — PROPOFOL 100 MG: 10 INJECTION, EMULSION INTRAVENOUS at 07:49

## 2023-08-15 RX ADMIN — MIDAZOLAM 0.5 MG: 1 INJECTION INTRAMUSCULAR; INTRAVENOUS at 08:28

## 2023-08-15 RX ADMIN — DEXAMETHASONE SODIUM PHOSPHATE 10 MG: 10 INJECTION INTRAMUSCULAR; INTRAVENOUS at 07:40

## 2023-08-15 RX ADMIN — MIDAZOLAM 1 MG: 1 INJECTION INTRAMUSCULAR; INTRAVENOUS at 07:43

## 2023-08-15 RX ADMIN — FENTANYL CITRATE 25 MCG: 50 INJECTION, SOLUTION INTRAMUSCULAR; INTRAVENOUS at 08:28

## 2023-08-15 RX ADMIN — PROPOFOL 50 MG: 10 INJECTION, EMULSION INTRAVENOUS at 08:30

## 2023-08-15 RX ADMIN — PROPOFOL 50 MG: 10 INJECTION, EMULSION INTRAVENOUS at 08:36

## 2023-08-15 RX ADMIN — FENTANYL CITRATE 25 MCG: 50 INJECTION, SOLUTION INTRAMUSCULAR; INTRAVENOUS at 08:02

## 2023-08-15 RX ADMIN — GLYCOPYRROLATE 0.1 MG: 1 INJECTION INTRAMUSCULAR; INTRAVENOUS at 07:49

## 2023-08-15 RX ADMIN — LIDOCAINE HYDROCHLORIDE 60 MG: 20 INJECTION, SOLUTION INFILTRATION; PERINEURAL at 07:49

## 2023-08-15 RX ADMIN — PROPOFOL 50 MG: 10 INJECTION, EMULSION INTRAVENOUS at 07:56

## 2023-08-15 RX ADMIN — MIDAZOLAM 0.5 MG: 1 INJECTION INTRAMUSCULAR; INTRAVENOUS at 08:11

## 2023-08-15 RX ADMIN — PROPOFOL 50 MG: 10 INJECTION, EMULSION INTRAVENOUS at 08:11

## 2023-08-15 RX ADMIN — ONDANSETRON HYDROCHLORIDE 4 MG: 2 SOLUTION INTRAMUSCULAR; INTRAVENOUS at 07:40

## 2023-08-15 RX ADMIN — FENTANYL CITRATE 25 MCG: 50 INJECTION, SOLUTION INTRAMUSCULAR; INTRAVENOUS at 08:11

## 2023-08-15 ASSESSMENT — LIFESTYLE VARIABLES: SMOKING_STATUS: 1

## 2023-08-15 ASSESSMENT — PAIN - FUNCTIONAL ASSESSMENT: PAIN_FUNCTIONAL_ASSESSMENT: NONE - DENIES PAIN

## 2023-08-15 NOTE — ANESTHESIA POSTPROCEDURE EVALUATION
Department of Anesthesiology  Postprocedure Note    Patient: Maurine Essex  MRN: [id number]  YOB: 1960  Date of evaluation: 8/15/2023      Procedure Summary     Date: 08/15/23 Room / Location: [facility] / [facility]    Anesthesia Start: 5078 Anesthesia Stop: 9376    Procedures:       EGD BIOPSY      EGD ESOPHAGOGASTRODUODENOSCOPY ULTRASOUND      COLONOSCOPY POLYPECTOMY SNARE/COLD BIOPSY Diagnosis:       Fried's esophagus      Personal history of colonic polyps      Hepatic cyst      (Fried's esophagus [K22.70])      (Personal history of colonic polyps [Z86.010])      (Hepatic cyst [K76.89])    Surgeons: Gadiel Ojeda MD Responsible Provider: Maria T Rothman MD    Anesthesia Type: MAC ASA Status: 3          Anesthesia Type: MAC    Jalen Phase I: Jalen Score: 10    Jalen Phase II:        Anesthesia Post Evaluation    Patient location during evaluation: PACU  Patient participation: complete - patient participated  Level of consciousness: sleepy but conscious  Pain score: 0  Airway patency: patent  Nausea & Vomiting: no vomiting and no nausea  Complications: no  Cardiovascular status: hemodynamically stable  Respiratory status: room air  Hydration status: stable  Pain management: adequate

## 2023-08-15 NOTE — DISCHARGE INSTRUCTIONS
Recommendations:  -The patient will be observed post procedure until all discharge criteria are met. -Patient has a contact number available for emergencies. The signs and symptoms of potential delayed complications were discussed with the patient.    -Return to normal activities tomorrow. -Written discharge instructions were provided to the patient.    -Resume anticoagulation tomorrow.  -Await pathology results.  -Timeframe until next colonoscopy will be 10 years.  -Clinic appointment to be scheduled.

## 2023-08-15 NOTE — OP NOTE
Operative Note      Patient: Rikki Kahn  YOB: 1960  MRN: [id number]    Date of Procedure: 8/15/2023    Pre-Op Diagnosis Codes: * Fried's esophagus [K22.70]     * Personal history of colonic polyps [Z86.010]     * Hepatic cyst [K76.89]    Post-Op Diagnosis: Irregular Z line, Normal EUS, colon polyps       Procedure(s):  EGD BIOPSY  EGD ESOPHAGOGASTRODUODENOSCOPY ULTRASOUND  COLONOSCOPY POLYPECTOMY SNARE/COLD BIOPSY    Surgeon(s):  Isai Lundberg MD    Assistant:   * No surgical staff found *    Anesthesia: Monitor Anesthesia Care    Estimated Blood Loss (mL): less than 50     Complications: None    Specimens:   ID Type Source Tests Collected by Time Destination   A : Antral biopsy r/o H. Pylori Gastric Gastric SURGICAL PATHOLOGY Isai Lundberg MD 8/15/2023 5216    B : irregular z-line biopsy  Gastric Gastric SURGICAL PATHOLOGY Isai Lundberg MD 8/15/2023 0757    C : recto-sigmoid polyps x 5 Tissue Colon CYTOLOGY, NON-GYN Isai Lundberg MD 8/15/2023 8715        Implants:  * No implants in log *      Drains: * No LDAs found *    Indications:  63y/F presents for evaluation of history of Fried's esophagus, biliary dilation, and colon polyps. Findings:     EGD:  Normal esophagus. No suggestion of Fried's esophagus. Hiatal hernia. Nodular mucosal changes to gastric aspect of Z line. Biopsies. Mild gastritis. Biopsied. Normal duodenum. EUS:  Normal pancreas. Mild biliary dilation with no intraductal debris, stones, or strictures appreciated. Normal ampulla. Colonoscopy:  Normal terminal ileum. Normal colon. Mild diverticulosis. Small hyperplastic-appearing polyps in rectosigmoid resected with cold snare. Hemorrhoids. Detailed Description of Procedure:   Pre-Anesthesia Assessment:  Prior to the procedure, a history and physical was performed and patient medications and allergies were reviewed.   The risks, benefits, complications, treatment options and expected outcomes were

## 2023-08-21 LAB — SURGICAL PATHOLOGY REPORT: NORMAL

## 2023-08-28 RX ORDER — TAMSULOSIN HYDROCHLORIDE 0.4 MG/1
0.4 CAPSULE ORAL DAILY
Qty: 90 CAPSULE | OUTPATIENT
Start: 2023-08-28

## 2023-08-31 DIAGNOSIS — F32.A ANXIETY AND DEPRESSION: ICD-10-CM

## 2023-08-31 DIAGNOSIS — F41.9 ANXIETY AND DEPRESSION: ICD-10-CM

## 2023-08-31 NOTE — TELEPHONE ENCOUNTER
Refill needed on:    Escitalopram 20 mg    Trazodone 50 mg    Apixaban 5 mg     At [facility] on [address]    Next visit 9/8/23

## 2023-09-01 RX ORDER — TRAZODONE HYDROCHLORIDE 50 MG/1
50 TABLET ORAL NIGHTLY PRN
Qty: 30 TABLET | Refills: 1 | Status: SHIPPED | OUTPATIENT
Start: 2023-09-01

## 2023-09-01 RX ORDER — ESCITALOPRAM OXALATE 20 MG/1
20 TABLET ORAL EVERY MORNING
Qty: 30 TABLET | Refills: 1 | Status: SHIPPED | OUTPATIENT
Start: 2023-09-01

## 2023-09-08 ENCOUNTER — OFFICE VISIT (OUTPATIENT)
Dept: FAMILY MEDICINE CLINIC | Age: 63
End: 2023-09-08
Payer: MEDICARE

## 2023-09-08 VITALS
HEART RATE: 65 BPM | SYSTOLIC BLOOD PRESSURE: 105 MMHG | TEMPERATURE: 97.2 F | DIASTOLIC BLOOD PRESSURE: 69 MMHG | RESPIRATION RATE: 16 BRPM | HEIGHT: 68 IN | WEIGHT: 170 LBS | BODY MASS INDEX: 25.76 KG/M2 | OXYGEN SATURATION: 96 %

## 2023-09-08 DIAGNOSIS — Z13.1 DIABETES MELLITUS SCREENING: ICD-10-CM

## 2023-09-08 DIAGNOSIS — F32.A ANXIETY AND DEPRESSION: Primary | ICD-10-CM

## 2023-09-08 DIAGNOSIS — Z13.220 LIPID SCREENING: ICD-10-CM

## 2023-09-08 DIAGNOSIS — Z12.31 ENCOUNTER FOR SCREENING MAMMOGRAM FOR MALIGNANT NEOPLASM OF BREAST: ICD-10-CM

## 2023-09-08 DIAGNOSIS — F41.9 ANXIETY AND DEPRESSION: Primary | ICD-10-CM

## 2023-09-08 DIAGNOSIS — R73.9 HYPERGLYCEMIA: ICD-10-CM

## 2023-09-08 PROBLEM — I50.22 CHRONIC SYSTOLIC (CONGESTIVE) HEART FAILURE (HCC): Status: ACTIVE | Noted: 2023-09-08

## 2023-09-08 PROCEDURE — 3017F COLORECTAL CA SCREEN DOC REV: CPT

## 2023-09-08 PROCEDURE — 4004F PT TOBACCO SCREEN RCVD TLK: CPT

## 2023-09-08 PROCEDURE — 99213 OFFICE O/P EST LOW 20 MIN: CPT

## 2023-09-08 PROCEDURE — G8427 DOCREV CUR MEDS BY ELIG CLIN: HCPCS

## 2023-09-08 PROCEDURE — G8419 CALC BMI OUT NRM PARAM NOF/U: HCPCS

## 2023-09-08 RX ORDER — TRAZODONE HYDROCHLORIDE 50 MG/1
50 TABLET ORAL NIGHTLY PRN
Qty: 90 TABLET | Refills: 0 | Status: SHIPPED | OUTPATIENT
Start: 2023-10-02 | End: 2023-12-31

## 2023-09-08 RX ORDER — ESCITALOPRAM OXALATE 20 MG/1
20 TABLET ORAL EVERY MORNING
Qty: 90 TABLET | Refills: 0 | Status: SHIPPED | OUTPATIENT
Start: 2023-10-02

## 2023-09-08 SDOH — ECONOMIC STABILITY: FOOD INSECURITY: WITHIN THE PAST 12 MONTHS, YOU WORRIED THAT YOUR FOOD WOULD RUN OUT BEFORE YOU GOT MONEY TO BUY MORE.: NEVER TRUE

## 2023-09-08 SDOH — ECONOMIC STABILITY: HOUSING INSECURITY
IN THE LAST 12 MONTHS, WAS THERE A TIME WHEN YOU DID NOT HAVE A STEADY PLACE TO SLEEP OR SLEPT IN A SHELTER (INCLUDING NOW)?: NO

## 2023-09-08 SDOH — ECONOMIC STABILITY: FOOD INSECURITY: WITHIN THE PAST 12 MONTHS, THE FOOD YOU BOUGHT JUST DIDN'T LAST AND YOU DIDN'T HAVE MONEY TO GET MORE.: NEVER TRUE

## 2023-09-08 SDOH — ECONOMIC STABILITY: INCOME INSECURITY: HOW HARD IS IT FOR YOU TO PAY FOR THE VERY BASICS LIKE FOOD, HOUSING, MEDICAL CARE, AND HEATING?: NOT VERY HARD

## 2023-09-10 ASSESSMENT — ENCOUNTER SYMPTOMS
ABDOMINAL PAIN: 0
SHORTNESS OF BREATH: 0
CHEST TIGHTNESS: 0
VOMITING: 0
DIARRHEA: 0
NAUSEA: 0
COUGH: 0

## 2023-10-26 ENCOUNTER — APPOINTMENT (OUTPATIENT)
Dept: CT IMAGING | Age: 63
End: 2023-10-26
Payer: MEDICARE

## 2023-10-26 ENCOUNTER — HOSPITAL ENCOUNTER (EMERGENCY)
Age: 63
Discharge: HOME | End: 2023-10-26
Attending: EMERGENCY MEDICINE
Payer: MEDICARE

## 2023-10-26 VITALS
TEMPERATURE: 98.6 F | OXYGEN SATURATION: 99 % | BODY MASS INDEX: 26.12 KG/M2 | HEIGHT: 69 IN | RESPIRATION RATE: 18 BRPM | HEART RATE: 80 BPM | SYSTOLIC BLOOD PRESSURE: 129 MMHG | WEIGHT: 176.37 LBS | DIASTOLIC BLOOD PRESSURE: 84 MMHG

## 2023-10-26 DIAGNOSIS — G44.309 POST-TRAUMATIC HEADACHE, NOT INTRACTABLE, UNSPECIFIED CHRONICITY PATTERN: ICD-10-CM

## 2023-10-26 DIAGNOSIS — Z79.01 CHRONIC ANTICOAGULATION: ICD-10-CM

## 2023-10-26 DIAGNOSIS — S01.112A EYEBROW LACERATION, LEFT, INITIAL ENCOUNTER: ICD-10-CM

## 2023-10-26 DIAGNOSIS — S12.9XXA CLOSED FRACTURE OF SPINOUS PROCESS OF CERVICAL VERTEBRA, INITIAL ENCOUNTER (HCC): Primary | ICD-10-CM

## 2023-10-26 DIAGNOSIS — M54.2 CERVICAL PAIN (NECK): ICD-10-CM

## 2023-10-26 DIAGNOSIS — R20.2 TINGLING OF RIGHT UPPER EXTREMITY: ICD-10-CM

## 2023-10-26 PROCEDURE — 99284 EMERGENCY DEPT VISIT MOD MDM: CPT

## 2023-10-26 PROCEDURE — 70450 CT HEAD/BRAIN W/O DYE: CPT

## 2023-10-26 PROCEDURE — 72125 CT NECK SPINE W/O DYE: CPT

## 2023-10-26 PROCEDURE — 12011 RPR F/E/E/N/L/M 2.5 CM/<: CPT

## 2023-10-26 RX ORDER — METHOCARBAMOL 500 MG/1
500 TABLET, FILM COATED ORAL 3 TIMES DAILY PRN
Qty: 15 TABLET | Refills: 0 | Status: SHIPPED | OUTPATIENT
Start: 2023-10-26 | End: 2023-10-31

## 2023-10-26 RX ORDER — ACETAMINOPHEN 500 MG
1000 TABLET ORAL ONCE
Status: DISCONTINUED | OUTPATIENT
Start: 2023-10-26 | End: 2023-10-27 | Stop reason: HOSPADM

## 2023-10-26 ASSESSMENT — PAIN DESCRIPTION - FREQUENCY: FREQUENCY: CONTINUOUS

## 2023-10-26 ASSESSMENT — PAIN DESCRIPTION - PAIN TYPE: TYPE: ACUTE PAIN

## 2023-10-26 ASSESSMENT — PAIN DESCRIPTION - DESCRIPTORS: DESCRIPTORS: ACHING

## 2023-10-26 ASSESSMENT — PAIN SCALES - GENERAL: PAINLEVEL_OUTOF10: 6

## 2023-10-26 ASSESSMENT — PAIN DESCRIPTION - LOCATION: LOCATION: HEAD

## 2023-10-26 ASSESSMENT — PAIN - FUNCTIONAL ASSESSMENT: PAIN_FUNCTIONAL_ASSESSMENT: 0-10

## 2023-10-27 NOTE — DISCHARGE INSTRUCTIONS
Do not get your wound wet for the next 3 days. Return if you get any signs of infection such as fever, chills or purulent drainage or any redness. Do not take muscle relaxants with alcohol, or with driving or operating heavy equipment.

## 2023-11-12 DIAGNOSIS — F32.A ANXIETY AND DEPRESSION: ICD-10-CM

## 2023-11-12 DIAGNOSIS — F41.9 ANXIETY AND DEPRESSION: ICD-10-CM

## 2023-11-13 RX ORDER — ESCITALOPRAM OXALATE 20 MG/1
20 TABLET ORAL EVERY MORNING
Qty: 90 TABLET | Refills: 0 | Status: SHIPPED | OUTPATIENT
Start: 2023-11-13

## 2023-11-20 ENCOUNTER — HOSPITAL ENCOUNTER (OUTPATIENT)
Dept: GENERAL RADIOLOGY | Age: 63
Discharge: HOME | End: 2023-11-22
Payer: MEDICARE

## 2023-11-20 ENCOUNTER — HOSPITAL ENCOUNTER (OUTPATIENT)
Age: 63
Discharge: HOME | End: 2023-11-22
Payer: MEDICARE

## 2023-11-20 ENCOUNTER — OFFICE VISIT (OUTPATIENT)
Dept: FAMILY MEDICINE CLINIC | Age: 63
End: 2023-11-20

## 2023-11-20 VITALS
HEART RATE: 72 BPM | TEMPERATURE: 97.5 F | OXYGEN SATURATION: 97 % | HEIGHT: 69 IN | BODY MASS INDEX: 26.36 KG/M2 | WEIGHT: 178 LBS | DIASTOLIC BLOOD PRESSURE: 64 MMHG | SYSTOLIC BLOOD PRESSURE: 107 MMHG

## 2023-11-20 DIAGNOSIS — I42.9 CARDIOMYOPATHY, UNSPECIFIED TYPE (HCC): ICD-10-CM

## 2023-11-20 DIAGNOSIS — M05.9 RHEUMATOID ARTHRITIS WITH POSITIVE RHEUMATOID FACTOR, INVOLVING UNSPECIFIED SITE (HCC): ICD-10-CM

## 2023-11-20 DIAGNOSIS — M25.469 KNEE SWELLING: ICD-10-CM

## 2023-11-20 DIAGNOSIS — M25.469 KNEE SWELLING: Primary | ICD-10-CM

## 2023-11-20 PROCEDURE — 73562 X-RAY EXAM OF KNEE 3: CPT

## 2023-11-20 ASSESSMENT — ENCOUNTER SYMPTOMS
CONSTIPATION: 0
DIARRHEA: 0
ABDOMINAL DISTENTION: 0
SHORTNESS OF BREATH: 0
NAUSEA: 0
VOMITING: 0
BACK PAIN: 0
CHEST TIGHTNESS: 0
STRIDOR: 0
COUGH: 0
ABDOMINAL PAIN: 0
EYES NEGATIVE: 1

## 2023-11-21 ENCOUNTER — PROCEDURE VISIT (OUTPATIENT)
Dept: FAMILY MEDICINE CLINIC | Age: 63
End: 2023-11-21
Payer: MEDICAID

## 2023-11-21 DIAGNOSIS — M25.562 ACUTE PAIN OF LEFT KNEE: Primary | ICD-10-CM

## 2023-11-21 DIAGNOSIS — M25.469 KNEE SWELLING: ICD-10-CM

## 2023-11-21 PROCEDURE — 99213 OFFICE O/P EST LOW 20 MIN: CPT

## 2023-11-21 ASSESSMENT — ENCOUNTER SYMPTOMS
EYES NEGATIVE: 1
CHOKING: 0
DIARRHEA: 0
CONSTIPATION: 0
COUGH: 0
VOMITING: 0
SHORTNESS OF BREATH: 0
NAUSEA: 0
ABDOMINAL DISTENTION: 0
ABDOMINAL PAIN: 0
WHEEZING: 0
STRIDOR: 0

## 2023-11-21 NOTE — PROGRESS NOTES
Had left knee effusion yesterday  Markedly improved today  Able to ambulate  Examination  Minimal effusion   Normal ROM  POCUS confirms minimal effusion. Patient fully informed: since effusion markedly improved over 24 hours, I am inclined to observe. Precautions given. Attending Physician Statement  I have discussed the case, including pertinent history and exam findings with the resident. I also have seen the patient and performed key portions of the examination. I agree with the documented assessment and plan.
from yesterday) and tenderness (medial aspect of knee joint) present. Normal range of motion. Cervical back: Normal range of motion. Skin:     General: Skin is warm and dry. Capillary Refill: Capillary refill takes less than 2 seconds. Neurological:      General: No focal deficit present. Mental Status: She is alert. Psychiatric:         Mood and Affect: Mood normal.         ASSESSMENT AND PLAN     There are no diagnoses linked to this encounter. Left knee joint swelling:    - Seeing much improvement in her joint swelling from yesterday, we are not convinced that she needs aspiration today. - Advised patient for RICE  - patient advised to continue her eliquis  - will follow up her left knee xray  - volteron gel for knee pain      Counseled regarding above diagnosis, including possible risks and complications, especially if left uncontrolled. Counseled regarding the possible side effects, risks, benefits and alternatives to treatment; patient and/or guardian verbalizes understanding, agrees, feels comfortable with and wishes to proceed with above treatment plan. Call or go to ED immediately if symptoms worsen or persist. Advised patient to call with any new medication issues, and, as applicable, read all Rx info from pharmacy to assure aware of all possible risks and side effects of medication before taking. Patient and/or guardian given opportunity to ask questions/raise concerns. The patient verbalized comfort and understanding of instructions. I encourage further reading and education about your health conditions. Information on many health conditions is provided by the American Academy of Family Physicians: https://familydoctor. org/  Please bring any questions to me at your next visit.     Medication List:    Current Outpatient Medications   Medication Sig Dispense Refill    escitalopram (LEXAPRO) 20 MG tablet TAKE 1 TABLET BY MOUTH EVERY MORNING 90 tablet 0    traZODone (DESYREL)

## 2023-12-10 DIAGNOSIS — F32.A ANXIETY AND DEPRESSION: ICD-10-CM

## 2023-12-10 DIAGNOSIS — F41.9 ANXIETY AND DEPRESSION: ICD-10-CM

## 2023-12-11 RX ORDER — TRAZODONE HYDROCHLORIDE 50 MG/1
50 TABLET ORAL NIGHTLY PRN
Qty: 90 TABLET | Refills: 0 | Status: SHIPPED | OUTPATIENT
Start: 2023-12-11

## 2024-01-08 ENCOUNTER — OFFICE VISIT (OUTPATIENT)
Dept: FAMILY MEDICINE CLINIC | Age: 64
End: 2024-01-08
Payer: MEDICARE

## 2024-01-08 VITALS
TEMPERATURE: 98 F | BODY MASS INDEX: 27.06 KG/M2 | WEIGHT: 178 LBS | DIASTOLIC BLOOD PRESSURE: 72 MMHG | SYSTOLIC BLOOD PRESSURE: 118 MMHG | RESPIRATION RATE: 17 BRPM | OXYGEN SATURATION: 94 % | HEART RATE: 80 BPM

## 2024-01-08 DIAGNOSIS — J45.901 MILD ASTHMA WITH EXACERBATION, UNSPECIFIED WHETHER PERSISTENT: Primary | ICD-10-CM

## 2024-01-08 PROCEDURE — G8484 FLU IMMUNIZE NO ADMIN: HCPCS

## 2024-01-08 PROCEDURE — G8419 CALC BMI OUT NRM PARAM NOF/U: HCPCS

## 2024-01-08 PROCEDURE — 99213 OFFICE O/P EST LOW 20 MIN: CPT

## 2024-01-08 PROCEDURE — G8427 DOCREV CUR MEDS BY ELIG CLIN: HCPCS

## 2024-01-08 PROCEDURE — 3017F COLORECTAL CA SCREEN DOC REV: CPT

## 2024-01-08 PROCEDURE — 4004F PT TOBACCO SCREEN RCVD TLK: CPT

## 2024-01-08 RX ORDER — ALBUTEROL SULFATE 90 UG/1
2 AEROSOL, METERED RESPIRATORY (INHALATION) 4 TIMES DAILY PRN
Qty: 18 G | Refills: 0 | Status: SHIPPED | OUTPATIENT
Start: 2024-01-08

## 2024-01-08 RX ORDER — BROMPHENIRAMINE MALEATE, PSEUDOEPHEDRINE HYDROCHLORIDE, AND DEXTROMETHORPHAN HYDROBROMIDE 2; 30; 10 MG/5ML; MG/5ML; MG/5ML
5 SYRUP ORAL 4 TIMES DAILY PRN
Qty: 118 ML | Refills: 0 | Status: SHIPPED | OUTPATIENT
Start: 2024-01-08

## 2024-01-08 RX ORDER — PREDNISONE 20 MG/1
20 TABLET ORAL 2 TIMES DAILY
Qty: 10 TABLET | Refills: 0 | Status: SHIPPED | OUTPATIENT
Start: 2024-01-08 | End: 2024-01-13

## 2024-01-08 ASSESSMENT — ENCOUNTER SYMPTOMS
CHEST TIGHTNESS: 1
ALLERGIC/IMMUNOLOGIC NEGATIVE: 1
COUGH: 0
EYES NEGATIVE: 1
CONSTIPATION: 0
DIARRHEA: 0
ABDOMINAL PAIN: 0
SHORTNESS OF BREATH: 1

## 2024-01-08 NOTE — PROGRESS NOTES
[facility]  Family Medicine Residency  Phone: [phone]  Fax: [phone]    Patient:  Mari Russo 63 y.o. female                                 Date of Service: 1/8/24                            Chiefcomplaint:   Chief Complaint   Patient presents with    Cough     Sinus headaches, winded from going up stairs. Non productive cough. Started 12/29.    Home Covid negative         History of Present Illness:     The patient is a 63 y.o. female  presented to the clinic with complaints as above.    Patient had sorethroat 1 week ago.currently has low grade fever 100-101 F since 2 days.At home COVID test-negative  Didn't take flu vaccine.  Known asthmatic has albuterol that was brought in 2019.Patient had used it 4 times this year,2 of them yesterday and today.  It started out as a sore throat currently having low-grade fever sinus headaches yellowish nasal discharge nonfoul-smelling nonproductive cough.  She also complains of feeling winded while walking the stairs and doing daily life activities and was worried.  She also complains of chest tightness and difficulty breathing on exertion.    Rheumatoid arthritis  On methotrexate    Atrial fibrillation  On eliquis    Denies any chills, n/v, headache, dizziness, vision changes, neck tenderness or stiffness, weakness, cp, palpitations, leg swelling/tenderness, sob, cough, abd pain, dysuria, hematuria, diarrhea, constipation, bloody stools.        Review of Systems:   Review of Systems   Constitutional:  Positive for fever. Negative for appetite change.   HENT: Negative.     Eyes: Negative.    Respiratory:  Positive for chest tightness and shortness of breath. Negative for cough.    Cardiovascular:  Negative for chest pain.   Gastrointestinal:  Negative for abdominal pain, constipation and diarrhea.   Endocrine: Negative.    Musculoskeletal: Negative.    Skin:  Negative for pallor.   Allergic/Immunologic: Negative.    Neurological: Negative.

## 2024-01-08 NOTE — PROGRESS NOTES
S: 63 y.o. female with   Chief Complaint   Patient presents with    Cough     Sinus headaches, winded from going up stairs. Non productive cough. Started 12/29.    Home Covid negative       Cough and low grade fevers. Known asthmatic and has RA on methotrexate/hydroxychlorquine. She reports sinus HA. Chest feels tight and some ARROYO noted.     O: VS:  weight is 80.7 kg (178 lb). Her temporal temperature is 98 °F (36.7 °C). Her blood pressure is 118/72 and her pulse is 80. Her respiration is 17 and oxygen saturation is 94%.   BP Readings from Last 3 Encounters:   01/08/24 118/72   11/30/23 118/77   11/20/23 107/64     See resident note      Impression/Plan:   1) Cough likely secondary to asthma  2) A/E Asthma - steroid burst and refill albuterol.         Health Maintenance Due   Topic Date Due    COVID-19 Vaccine (1) Never done    Pneumococcal 0-64 years Vaccine (1 - PCV) Never done    HIV screen  Never done    Hepatitis C screen  Never done    DTaP/Tdap/Td vaccine (1 - Tdap) Never done    Breast cancer screen  Never done    Shingles vaccine (1 of 2) Never done    Lipids  03/09/2022    Respiratory Syncytial Virus (RSV) Pregnant or age 60 yrs+ (1 - 1-dose 60+ series) Never done    A1C test (Diabetic or Prediabetic)  08/11/2022    Flu vaccine (1) 08/01/2023    Annual Wellness Visit (Medicare)  Never done    Depression Monitoring  01/24/2024         Attending Physician Statement  I have discussed the case, including pertinent history and exam findings with the resident.  I agree with the documented assessment and plan.      Chepe Robles MD

## 2024-01-29 RX ORDER — ATORVASTATIN CALCIUM 40 MG/1
40 TABLET, FILM COATED ORAL DAILY
Qty: 90 TABLET | OUTPATIENT
Start: 2024-01-29

## 2024-01-29 RX ORDER — ATORVASTATIN CALCIUM 40 MG/1
40 TABLET, FILM COATED ORAL DAILY
Qty: 30 TABLET | Refills: 0 | Status: SHIPPED | OUTPATIENT
Start: 2024-01-29

## 2024-01-30 DIAGNOSIS — J45.901 MILD ASTHMA WITH EXACERBATION, UNSPECIFIED WHETHER PERSISTENT: ICD-10-CM

## 2024-02-07 DIAGNOSIS — F41.9 ANXIETY AND DEPRESSION: ICD-10-CM

## 2024-02-07 DIAGNOSIS — F32.A ANXIETY AND DEPRESSION: ICD-10-CM

## 2024-02-07 RX ORDER — ALBUTEROL SULFATE 90 UG/1
AEROSOL, METERED RESPIRATORY (INHALATION)
Qty: 18 G | Refills: 0 | Status: SHIPPED | OUTPATIENT
Start: 2024-02-07

## 2024-02-07 RX ORDER — ESCITALOPRAM OXALATE 20 MG/1
20 TABLET ORAL EVERY MORNING
Qty: 30 TABLET | Refills: 0 | Status: SHIPPED | OUTPATIENT
Start: 2024-02-07

## 2024-02-27 RX ORDER — ATORVASTATIN CALCIUM 40 MG/1
40 TABLET, FILM COATED ORAL DAILY
Qty: 90 TABLET | Refills: 1 | Status: SHIPPED | OUTPATIENT
Start: 2024-02-27

## 2024-03-06 DIAGNOSIS — J45.901 MILD ASTHMA WITH EXACERBATION, UNSPECIFIED WHETHER PERSISTENT: ICD-10-CM

## 2024-03-06 RX ORDER — ALBUTEROL SULFATE 90 UG/1
AEROSOL, METERED RESPIRATORY (INHALATION)
Qty: 18 G | Refills: 0 | OUTPATIENT
Start: 2024-03-06

## 2024-06-13 DIAGNOSIS — F32.A ANXIETY AND DEPRESSION: ICD-10-CM

## 2024-06-13 DIAGNOSIS — F41.9 ANXIETY AND DEPRESSION: ICD-10-CM

## 2024-06-13 RX ORDER — TRAZODONE HYDROCHLORIDE 50 MG/1
50 TABLET ORAL NIGHTLY PRN
Qty: 90 TABLET | Refills: 0 | Status: SHIPPED | OUTPATIENT
Start: 2024-06-13

## 2024-06-18 DIAGNOSIS — F41.9 ANXIETY AND DEPRESSION: ICD-10-CM

## 2024-06-18 DIAGNOSIS — F32.A ANXIETY AND DEPRESSION: ICD-10-CM

## 2024-06-18 NOTE — TELEPHONE ENCOUNTER
Refill request    Lexapro 20mg  Pt is out of med x1 week and feeling it. Has appt Thursday in office, can we send enough medication to get her to that appt?    Preferred pharmacy: [facility] [address] [place]    Last Appointment   1/8/2024  Next Appointment  6/20/2024

## 2024-06-19 ENCOUNTER — OFFICE VISIT (OUTPATIENT)
Dept: CARDIOLOGY CLINIC | Age: 64
End: 2024-06-19
Payer: MEDICARE

## 2024-06-19 VITALS
BODY MASS INDEX: 26.07 KG/M2 | HEIGHT: 68 IN | RESPIRATION RATE: 16 BRPM | WEIGHT: 172 LBS | SYSTOLIC BLOOD PRESSURE: 116 MMHG | HEART RATE: 62 BPM | DIASTOLIC BLOOD PRESSURE: 60 MMHG

## 2024-06-19 DIAGNOSIS — I48.0 PAROXYSMAL ATRIAL FIBRILLATION (HCC): ICD-10-CM

## 2024-06-19 DIAGNOSIS — I42.9 CARDIOMYOPATHY, UNSPECIFIED TYPE (HCC): Primary | ICD-10-CM

## 2024-06-19 DIAGNOSIS — I50.42 CHRONIC COMBINED SYSTOLIC AND DIASTOLIC CONGESTIVE HEART FAILURE (HCC): ICD-10-CM

## 2024-06-19 DIAGNOSIS — Z79.01 ON APIXABAN THERAPY: ICD-10-CM

## 2024-06-19 PROCEDURE — 4004F PT TOBACCO SCREEN RCVD TLK: CPT | Performed by: INTERNAL MEDICINE

## 2024-06-19 PROCEDURE — 93000 ELECTROCARDIOGRAM COMPLETE: CPT | Performed by: INTERNAL MEDICINE

## 2024-06-19 PROCEDURE — 99214 OFFICE O/P EST MOD 30 MIN: CPT | Performed by: INTERNAL MEDICINE

## 2024-06-19 PROCEDURE — 3017F COLORECTAL CA SCREEN DOC REV: CPT | Performed by: INTERNAL MEDICINE

## 2024-06-19 PROCEDURE — G8419 CALC BMI OUT NRM PARAM NOF/U: HCPCS | Performed by: INTERNAL MEDICINE

## 2024-06-19 PROCEDURE — G8427 DOCREV CUR MEDS BY ELIG CLIN: HCPCS | Performed by: INTERNAL MEDICINE

## 2024-06-19 RX ORDER — ESCITALOPRAM OXALATE 20 MG/1
20 TABLET ORAL EVERY MORNING
Qty: 30 TABLET | Refills: 0 | Status: SHIPPED | OUTPATIENT
Start: 2024-06-19

## 2024-06-19 NOTE — PROGRESS NOTES
OUTPATIENT CARDIOLOGY FOLLOW-UP    Name: Mari Russo    Age: 63 y.o.    Primary Care Physician: Katerina Childs MD    Date of Service: 6/19/2024    Chief Complaint:   Chief Complaint   Patient presents with    Atrial Fibrillation        Interim History:   Here for follow-up last seen 3/2022 for mild cardiomyopathy, failed to follow-up with me subsequently.  Follows with the EP for PAF.  Stress test was nondiagnostic, so I recommended coronary CTA which showed mild to moderate CAD.    Gets occasional palpitations.  Gets occasional shortness of breath.  Denies chest pain.    Feels like she has swelling in her left leg, states it does not feel like when she had a DVT.  She is compliant with apixaban.    Still smoking a pack of cigarettes every couple days.    Review of Systems:   Negative except as described above    Past Medical History:  Past Medical History:   Diagnosis Date    Anxiety and depression     Atrial fibrillation (HCC)     Fried's esophagus with dysplasia     Bleeding tendency (HCC)     due to blood thinners    COVID-19 04/04/2021    Headache     3-5x per month, 4/10 in the painscale    Hx of deep venous thrombosis     Lupus (HCC)     Stroke (HCC)     appox 2010       Past Surgical History:  Past Surgical History:   Procedure Laterality Date    APPENDECTOMY      BREAST BIOPSY Right     2006 and approx 2010    BUNIONECTOMY Right     great toe approx 2011    CHOLECYSTECTOMY      COLONOSCOPY N/A 8/15/2023    COLONOSCOPY POLYPECTOMY SNARE/COLD BIOPSY performed by Aniceto Arriaga MD at [facility] ENDOSCOPY    ENDOSCOPY, COLON, DIAGNOSTIC      approx 2005  insertion of bile duct stent 2007 removal of bile duct stent    FOOT SURGERY Right     approx 2012 bone removed and pins inserted second toe and pins metatarsals    UPPER GASTROINTESTINAL ENDOSCOPY N/A 8/15/2023    EGD BIOPSY performed by Aniceto Arriaga MD at [facility] ENDOSCOPY    UPPER GASTROINTESTINAL ENDOSCOPY N/A 8/15/2023    EGD

## 2024-06-20 ENCOUNTER — OFFICE VISIT (OUTPATIENT)
Dept: FAMILY MEDICINE CLINIC | Age: 64
End: 2024-06-20

## 2024-06-20 VITALS
TEMPERATURE: 97.5 F | OXYGEN SATURATION: 97 % | BODY MASS INDEX: 25.73 KG/M2 | HEIGHT: 68 IN | DIASTOLIC BLOOD PRESSURE: 74 MMHG | SYSTOLIC BLOOD PRESSURE: 120 MMHG | HEART RATE: 55 BPM | WEIGHT: 169.75 LBS | RESPIRATION RATE: 19 BRPM

## 2024-06-20 DIAGNOSIS — S81.802S LEG WOUND, LEFT, SEQUELA: Primary | ICD-10-CM

## 2024-06-20 DIAGNOSIS — I48.91 ATRIAL FIBRILLATION, UNSPECIFIED TYPE (HCC): ICD-10-CM

## 2024-06-20 DIAGNOSIS — D68.69 SECONDARY HYPERCOAGULABLE STATE (HCC): ICD-10-CM

## 2024-06-20 DIAGNOSIS — J45.30 MILD PERSISTENT ASTHMA, UNSPECIFIED WHETHER COMPLICATED: ICD-10-CM

## 2024-06-20 RX ORDER — DILTIAZEM HYDROCHLORIDE 60 MG/1
TABLET, FILM COATED ORAL
Qty: 10.2 G | Refills: 1
Start: 2024-06-20

## 2024-06-20 RX ORDER — ALBUTEROL SULFATE 90 UG/1
AEROSOL, METERED RESPIRATORY (INHALATION)
Qty: 18 G | Refills: 0 | Status: CANCELLED | OUTPATIENT
Start: 2024-06-20

## 2024-06-20 RX ORDER — GINSENG 100 MG
CAPSULE ORAL
Qty: 14 G | Refills: 1 | Status: SHIPPED | OUTPATIENT
Start: 2024-06-20 | End: 2024-06-30

## 2024-06-20 ASSESSMENT — COLUMBIA-SUICIDE SEVERITY RATING SCALE - C-SSRS
1. WITHIN THE PAST MONTH, HAVE YOU WISHED YOU WERE DEAD OR WISHED YOU COULD GO TO SLEEP AND NOT WAKE UP?: NO
6. HAVE YOU EVER DONE ANYTHING, STARTED TO DO ANYTHING, OR PREPARED TO DO ANYTHING TO END YOUR LIFE?: NO
2. HAVE YOU ACTUALLY HAD ANY THOUGHTS OF KILLING YOURSELF?: NO

## 2024-06-20 ASSESSMENT — PATIENT HEALTH QUESTIONNAIRE - PHQ9
2. FEELING DOWN, DEPRESSED OR HOPELESS: SEVERAL DAYS
5. POOR APPETITE OR OVEREATING: NOT AT ALL
9. THOUGHTS THAT YOU WOULD BE BETTER OFF DEAD, OR OF HURTING YOURSELF: NOT AT ALL
1. LITTLE INTEREST OR PLEASURE IN DOING THINGS: SEVERAL DAYS
6. FEELING BAD ABOUT YOURSELF - OR THAT YOU ARE A FAILURE OR HAVE LET YOURSELF OR YOUR FAMILY DOWN: NOT AT ALL
4. FEELING TIRED OR HAVING LITTLE ENERGY: NOT AT ALL
8. MOVING OR SPEAKING SO SLOWLY THAT OTHER PEOPLE COULD HAVE NOTICED. OR THE OPPOSITE, BEING SO FIGETY OR RESTLESS THAT YOU HAVE BEEN MOVING AROUND A LOT MORE THAN USUAL: NOT AT ALL
SUM OF ALL RESPONSES TO PHQ QUESTIONS 1-9: 2
3. TROUBLE FALLING OR STAYING ASLEEP: NOT AT ALL
SUM OF ALL RESPONSES TO PHQ QUESTIONS 1-9: 2
10. IF YOU CHECKED OFF ANY PROBLEMS, HOW DIFFICULT HAVE THESE PROBLEMS MADE IT FOR YOU TO DO YOUR WORK, TAKE CARE OF THINGS AT HOME, OR GET ALONG WITH OTHER PEOPLE: NOT DIFFICULT AT ALL
SUM OF ALL RESPONSES TO PHQ9 QUESTIONS 1 & 2: 2
7. TROUBLE CONCENTRATING ON THINGS, SUCH AS READING THE NEWSPAPER OR WATCHING TELEVISION: NOT AT ALL
SUM OF ALL RESPONSES TO PHQ QUESTIONS 1-9: 2
SUM OF ALL RESPONSES TO PHQ QUESTIONS 1-9: 2

## 2024-06-20 NOTE — PROGRESS NOTES
[facility]  Precepting Note    Subjective:  Leg wound, L. Accident about 3 weeks ago. UTD on tetanus. Treated with keflex and bacitracin. Remains painful.   On eliquis for clotting hx    Hemoglobin A1C   Date Value Ref Range Status   08/11/2021 5.9 % Final         ROS otherwise negative     Past medical, surgical, family and social history were reviewed, non-contributory, and unchanged unless otherwise stated.    Objective:    /74   Pulse 55   Temp 97.5 °F (36.4 °C) (Temporal)   Resp 19   Ht 1.727 m (5' 8\")   Wt 77 kg (169 lb 12.1 oz)   SpO2 97%   BMI 25.81 kg/m²     Exam is as noted by resident with the following changes, additions or corrections:    General:  NAD; alert & oriented x 3   Heart:  RRR, no murmurs, gallops, or rubs.  Lungs:  CTA bilaterally, no wheeze, rales or rhonchi  Abd: bowel sounds present, nontender, nondistended, no masses  Extrem:  No clubbing, cyanosis, or edema    Assessment/Plan:  OK to continue topical antibiotics. Continue to monitor. Alarm symptoms discussed  Add symbicort for asthma     Attending Physician Statement  I have reviewed the chart, including any radiology or labs. I have discussed the case, including pertinent history and exam findings with the resident.  I agree with the assessment, plan and orders as documented by the resident.  Please refer to the resident note for additional information.      Electronically signed by Jesu Campos MD on 6/20/2024 at 12:04 PM    
opportunity to ask questions/raise concerns.The patient verbalized comfort and understanding of instructions.    I encourage further reading and education about your health conditions.Information on many health conditions is provided by the [employer] Academy [employer]: https://familydoctor.org/  Please bring any questions to me at your next visit.    Medication List:    Current Outpatient Medications   Medication Sig Dispense Refill    apixaban (ELIQUIS) 5 MG TABS tablet TAKE 1 TABLET BY MOUTH TWICE DAILY 60 tablet 2    bacitracin 500 UNIT/GM ointment Apply topically 2 times daily. 14 g 1    SYMBICORT 80-4.5 MCG/ACT AERO Use daily for the next two weeks. Then, 1 to 2 inhalations, as needed, every 4 hours. 10.2 g 1    escitalopram (LEXAPRO) 20 MG tablet Take 1 tablet by mouth every morning 30 tablet 0    traZODone (DESYREL) 50 MG tablet TAKE 1 TABLET BY MOUTH EVERY NIGHT AS NEEDED FOR SLEEP 90 tablet 0    atorvastatin (LIPITOR) 40 MG tablet TAKE 1 TABLET BY MOUTH DAILY 90 tablet 1    albuterol sulfate HFA (PROVENTIL;VENTOLIN;PROAIR) 108 (90 Base) MCG/ACT inhaler INHALE 2 PUFFS INTO THE LUNGS FOUR TIMES DAILY AS NEEDED FOR WHEEZING 18 g 0    metoprolol succinate (TOPROL XL) 25 MG extended release tablet TAKE 1 TABLET BY MOUTH DAILY (Patient taking differently: every morning TAKE 1 TABLET BY MOUTH DAILY.) 90 tablet 3    Cholecalciferol (VITAMIN D3) 50 MCG (2000 UT) CAPS Take 1 capsule by mouth every morning      folic acid (FOLVITE) 1 MG tablet Take 1 tablet by mouth every morning      hydroxychloroquine (PLAQUENIL) 200 MG tablet Take 1 tablet by mouth 2 times daily Take 1 tablet by mouth twice a day      methotrexate (RHEUMATREX) 2.5 MG chemo tablet Take 1 tablet by mouth once a week Take 6 tablets by mouth weekly on Thursday      brompheniramine-pseudoephedrine-DM 2-30-10 MG/5ML syrup Take 5 mLs by mouth 4 times daily as needed for Congestion or Cough (Patient not taking: Reported on 6/20/2024) 118 mL 0

## 2024-07-18 DIAGNOSIS — F41.9 ANXIETY AND DEPRESSION: ICD-10-CM

## 2024-07-18 DIAGNOSIS — F32.A ANXIETY AND DEPRESSION: ICD-10-CM

## 2024-07-18 RX ORDER — ESCITALOPRAM OXALATE 20 MG/1
20 TABLET ORAL EVERY MORNING
Qty: 90 TABLET | Refills: 0 | Status: SHIPPED | OUTPATIENT
Start: 2024-07-18

## 2024-07-18 NOTE — TELEPHONE ENCOUNTER
Last Appointment   6/20/2024  Next Appointment  Visit date not found  Return to Office: Return if symptoms worsen or fail to improve.

## 2024-08-20 DIAGNOSIS — F32.A ANXIETY AND DEPRESSION: ICD-10-CM

## 2024-08-20 DIAGNOSIS — F41.9 ANXIETY AND DEPRESSION: ICD-10-CM

## 2024-08-20 RX ORDER — ESCITALOPRAM OXALATE 20 MG/1
20 TABLET ORAL EVERY MORNING
Qty: 30 TABLET | Refills: 2 | Status: SHIPPED | OUTPATIENT
Start: 2024-08-20

## 2024-09-15 DIAGNOSIS — F41.9 ANXIETY AND DEPRESSION: ICD-10-CM

## 2024-09-15 DIAGNOSIS — F32.A ANXIETY AND DEPRESSION: ICD-10-CM

## 2024-09-16 DIAGNOSIS — D68.69 SECONDARY HYPERCOAGULABLE STATE (HCC): ICD-10-CM

## 2024-09-16 DIAGNOSIS — I48.91 ATRIAL FIBRILLATION, UNSPECIFIED TYPE (HCC): ICD-10-CM

## 2024-09-16 RX ORDER — TRAZODONE HYDROCHLORIDE 50 MG/1
50 TABLET, FILM COATED ORAL NIGHTLY PRN
Qty: 90 TABLET | Refills: 0 | Status: SHIPPED | OUTPATIENT
Start: 2024-09-16

## 2024-11-18 DIAGNOSIS — F41.9 ANXIETY AND DEPRESSION: ICD-10-CM

## 2024-11-18 DIAGNOSIS — F32.A ANXIETY AND DEPRESSION: ICD-10-CM

## 2024-11-18 RX ORDER — ESCITALOPRAM OXALATE 20 MG/1
20 TABLET ORAL EVERY MORNING
Qty: 30 TABLET | Refills: 2 | Status: SHIPPED | OUTPATIENT
Start: 2024-11-18

## 2024-11-18 NOTE — TELEPHONE ENCOUNTER
Name of Medication(s) Requested:  Requested Prescriptions     Pending Prescriptions Disp Refills    escitalopram (LEXAPRO) 20 MG tablet 30 tablet 2     Sig: Take 1 tablet by mouth every morning       Medication is on current medication list Yes    Dosage and directions were verified? Yes    Quantity verified: 90 day supply     Pharmacy Verified?  Yes    Last Appointment:  Visit date not found    Future appts:  Future Appointments   Date Time Provider Department Center   11/19/2024 11:00 AM SEY [facility] ECHO 1 [facility]   11/20/2024  8:15 AM Eros Romero MD [facility]        (If no appt send self scheduling link. .REFILLAPPT)  Scheduling request sent?     [] Yes  [] No    Does patient need updated?  [x] Yes  [] No

## 2024-11-19 ENCOUNTER — HOSPITAL ENCOUNTER (OUTPATIENT)
Dept: CARDIOLOGY | Age: 64
Discharge: HOME | End: 2024-11-21
Attending: INTERNAL MEDICINE
Payer: MEDICARE

## 2024-11-19 VITALS
SYSTOLIC BLOOD PRESSURE: 120 MMHG | WEIGHT: 169 LBS | DIASTOLIC BLOOD PRESSURE: 64 MMHG | HEIGHT: 68 IN | BODY MASS INDEX: 25.61 KG/M2

## 2024-11-19 DIAGNOSIS — I42.9 CARDIOMYOPATHY, UNSPECIFIED TYPE (HCC): ICD-10-CM

## 2024-11-19 PROCEDURE — 93306 TTE W/DOPPLER COMPLETE: CPT

## 2024-11-20 ENCOUNTER — OFFICE VISIT (OUTPATIENT)
Dept: CARDIOLOGY CLINIC | Age: 64
End: 2024-11-20

## 2024-11-20 VITALS
BODY MASS INDEX: 26.73 KG/M2 | HEIGHT: 69 IN | SYSTOLIC BLOOD PRESSURE: 120 MMHG | RESPIRATION RATE: 16 BRPM | DIASTOLIC BLOOD PRESSURE: 64 MMHG | HEART RATE: 62 BPM | OXYGEN SATURATION: 96 % | TEMPERATURE: 98.9 F | WEIGHT: 180.5 LBS

## 2024-11-20 DIAGNOSIS — Z79.01 ON APIXABAN THERAPY: ICD-10-CM

## 2024-11-20 DIAGNOSIS — Z72.0 TOBACCO ABUSE: ICD-10-CM

## 2024-11-20 DIAGNOSIS — I42.9 CARDIOMYOPATHY, UNSPECIFIED TYPE (HCC): Primary | ICD-10-CM

## 2024-11-20 DIAGNOSIS — I48.0 PAF (PAROXYSMAL ATRIAL FIBRILLATION) (HCC): ICD-10-CM

## 2024-11-20 LAB
ECHO AO ASC DIAM: 3 CM
ECHO AO ASCENDING AORTA INDEX: 1.58 CM/M2
ECHO AV AREA PEAK VELOCITY: 2.2 CM2
ECHO AV AREA VTI: 2.7 CM2
ECHO AV AREA/BSA PEAK VELOCITY: 1.2 CM2/M2
ECHO AV AREA/BSA VTI: 1.4 CM2/M2
ECHO AV CUSP MM: 2 CM
ECHO AV MEAN GRADIENT: 2 MMHG
ECHO AV MEAN VELOCITY: 0.7 M/S
ECHO AV PEAK GRADIENT: 5 MMHG
ECHO AV PEAK VELOCITY: 1.1 M/S
ECHO AV VELOCITY RATIO: 0.73
ECHO AV VTI: 19.2 CM
ECHO BSA: 1.92 M2
ECHO LA DIAMETER INDEX: 1.89 CM/M2
ECHO LA DIAMETER: 3.6 CM
ECHO LA VOL A-L A2C: 75 ML (ref 22–52)
ECHO LA VOL A-L A4C: 56 ML (ref 22–52)
ECHO LA VOL MOD A2C: 71 ML (ref 22–52)
ECHO LA VOL MOD A4C: 47 ML (ref 22–52)
ECHO LA VOLUME AREA LENGTH: 66 ML
ECHO LA VOLUME INDEX A-L A2C: 39 ML/M2 (ref 16–34)
ECHO LA VOLUME INDEX A-L A4C: 29 ML/M2 (ref 16–34)
ECHO LA VOLUME INDEX AREA LENGTH: 35 ML/M2 (ref 16–34)
ECHO LA VOLUME INDEX MOD A2C: 37 ML/M2 (ref 16–34)
ECHO LA VOLUME INDEX MOD A4C: 25 ML/M2 (ref 16–34)
ECHO LV EF PHYSICIAN: 55 %
ECHO LV FRACTIONAL SHORTENING: 27 % (ref 28–44)
ECHO LV INTERNAL DIMENSION DIASTOLE INDEX: 2.95 CM/M2
ECHO LV INTERNAL DIMENSION DIASTOLIC: 5.6 CM (ref 3.9–5.3)
ECHO LV INTERNAL DIMENSION SYSTOLIC INDEX: 2.16 CM/M2
ECHO LV INTERNAL DIMENSION SYSTOLIC: 4.1 CM
ECHO LV ISOVOLUMETRIC RELAXATION TIME (IVRT): 101.5 MS
ECHO LV IVSD: 0.8 CM (ref 0.6–0.9)
ECHO LV IVSS: 1.4 CM
ECHO LV MASS 2D: 165 G (ref 67–162)
ECHO LV MASS INDEX 2D: 86.9 G/M2 (ref 43–95)
ECHO LV POSTERIOR WALL DIASTOLIC: 0.8 CM (ref 0.6–0.9)
ECHO LV POSTERIOR WALL SYSTOLIC: 1.4 CM
ECHO LV RELATIVE WALL THICKNESS RATIO: 0.29
ECHO LVOT AREA: 3.1 CM2
ECHO LVOT AV VTI INDEX: 0.86
ECHO LVOT DIAM: 2 CM
ECHO LVOT MEAN GRADIENT: 1 MMHG
ECHO LVOT PEAK GRADIENT: 3 MMHG
ECHO LVOT PEAK VELOCITY: 0.8 M/S
ECHO LVOT STROKE VOLUME INDEX: 27.4 ML/M2
ECHO LVOT SV: 52.1 ML
ECHO LVOT VTI: 16.6 CM
ECHO MV "A" WAVE DURATION: 83 MSEC
ECHO MV A VELOCITY: 0.62 M/S
ECHO MV AREA PHT: 3.8 CM2
ECHO MV AREA VTI: 2.8 CM2
ECHO MV E DECELERATION TIME (DT): 190.6 MS
ECHO MV E VELOCITY: 0.44 M/S
ECHO MV E/A RATIO: 0.71
ECHO MV LVOT VTI INDEX: 1.14
ECHO MV MAX VELOCITY: 0.8 M/S
ECHO MV MEAN GRADIENT: 1 MMHG
ECHO MV MEAN VELOCITY: 0.4 M/S
ECHO MV PEAK GRADIENT: 2 MMHG
ECHO MV PRESSURE HALF TIME (PHT): 57.3 MS
ECHO MV VTI: 18.9 CM
ECHO PV MAX VELOCITY: 0.8 M/S
ECHO PV MEAN GRADIENT: 1 MMHG
ECHO PV MEAN VELOCITY: 0.5 M/S
ECHO PV PEAK GRADIENT: 2 MMHG
ECHO PV VTI: 15.6 CM
ECHO PVEIN A DURATION: 83 MS
ECHO PVEIN A VELOCITY: 0.4 M/S
ECHO PVEIN PEAK D VELOCITY: 0.5 M/S
ECHO PVEIN PEAK S VELOCITY: 0.8 M/S
ECHO PVEIN S/D RATIO: 1.6
ECHO RV TAPSE: 2.2 CM (ref 1.7–?)

## 2024-11-20 RX ORDER — METOPROLOL SUCCINATE 25 MG/1
TABLET, EXTENDED RELEASE ORAL
Qty: 90 TABLET | Refills: 3 | Status: SHIPPED | OUTPATIENT
Start: 2024-11-20

## 2024-11-20 NOTE — PROGRESS NOTES
OUTPATIENT CARDIOLOGY FOLLOW-UP    Name: Mari Russo    Age: 64 y.o.    Primary Care Physician: Francisco Araujo MD    Date of Service: 11/20/2024    Chief Complaint:   Chief Complaint   Patient presents with    6 Month Follow-Up        Interim History:   Here for follow-up last seen for mild cardiomyopathy.  Follows with the EP for PAF.  Prior stress test was nondiagnostic, so I recommended coronary CTA which showed mild to moderate CAD.    Feeling okay overall.  Started a walking regimen.  Denies chest pain shortness of breath.  Gets occasional fluttering.  Has been out of metoprolol for few weeks.  Still smokes a pack of cigarettes a week.    Had repeat echo done yesterday which showed improvement of EF to 50 to 55%.    Did not tolerate atorvastatin.    Review of Systems:   Negative except as described above    Past Medical History:  Past Medical History:   Diagnosis Date    Anxiety and depression     Atrial fibrillation (HCC)     Fried's esophagus with dysplasia     Bleeding tendency (HCC)     due to blood thinners    COVID-19 04/04/2021    Headache     3-5x per month, 4/10 in the painscale    Hx of deep venous thrombosis     Lupus     Stroke (HCC)     appox 2010       Past Surgical History:  Past Surgical History:   Procedure Laterality Date    APPENDECTOMY      BREAST BIOPSY Right     2006 and approx 2010    BUNIONECTOMY Right     great toe approx 2011    CHOLECYSTECTOMY      COLONOSCOPY N/A 8/15/2023    COLONOSCOPY POLYPECTOMY SNARE/COLD BIOPSY performed by Aniceto Arriaga MD at [facility] ENDOSCOPY    ENDOSCOPY, COLON, DIAGNOSTIC      approx 2005  insertion of bile duct stent 2007 removal of bile duct stent    FOOT SURGERY Right     approx 2012 bone removed and pins inserted second toe and pins metatarsals    UPPER GASTROINTESTINAL ENDOSCOPY N/A 8/15/2023    EGD BIOPSY performed by Aniceto Arriaga MD at [facility] ENDOSCOPY    UPPER GASTROINTESTINAL ENDOSCOPY N/A 8/15/2023    EGD

## 2025-06-30 DIAGNOSIS — F41.9 ANXIETY AND DEPRESSION: ICD-10-CM

## 2025-06-30 DIAGNOSIS — F32.A ANXIETY AND DEPRESSION: ICD-10-CM

## 2025-06-30 RX ORDER — ESCITALOPRAM OXALATE 20 MG/1
20 TABLET ORAL EVERY MORNING
Qty: 30 TABLET | Refills: 0 | Status: SHIPPED | OUTPATIENT
Start: 2025-06-30

## 2025-06-30 NOTE — TELEPHONE ENCOUNTER
Name of Medication(s) Requested:  Requested Prescriptions     Pending Prescriptions Disp Refills    escitalopram (LEXAPRO) 20 MG tablet 30 tablet 2     Sig: Take 1 tablet by mouth every morning       Medication is on current medication list Yes    Dosage and directions were verified? Yes    Quantity verified: 90 day supply     Pharmacy Verified?  Yes    Last Appointment:  Visit date not found    Future appts:  Future Appointments   Date Time Provider Department Center   11/24/2025  8:45 AM Eros Romero MD [facility]        (If no appt send self scheduling link. .REFILLAPPT)  Scheduling request sent?     [x] Yes  [] No    Does patient need updated?  [x] Yes  [] No

## 2025-07-14 ENCOUNTER — OFFICE VISIT (OUTPATIENT)
Dept: FAMILY MEDICINE CLINIC | Age: 65
End: 2025-07-14

## 2025-07-14 VITALS
TEMPERATURE: 97.8 F | HEART RATE: 88 BPM | DIASTOLIC BLOOD PRESSURE: 79 MMHG | OXYGEN SATURATION: 97 % | SYSTOLIC BLOOD PRESSURE: 117 MMHG | HEIGHT: 69 IN | RESPIRATION RATE: 18 BRPM | WEIGHT: 182 LBS | BODY MASS INDEX: 26.96 KG/M2

## 2025-07-14 DIAGNOSIS — R39.15 URINARY URGENCY: Primary | ICD-10-CM

## 2025-07-14 DIAGNOSIS — I48.91 ATRIAL FIBRILLATION, UNSPECIFIED TYPE (HCC): ICD-10-CM

## 2025-07-14 DIAGNOSIS — Z76.0 MEDICATION REFILL: ICD-10-CM

## 2025-07-14 LAB
BILIRUBIN, POC: NEGATIVE
BLOOD URINE, POC: NEGATIVE
CLARITY, POC: NORMAL
COLOR, POC: NORMAL
GLUCOSE URINE, POC: NEGATIVE MG/DL
KETONES, POC: NEGATIVE MG/DL
LEUKOCYTE EST, POC: NEGATIVE
NITRITE, POC: NEGATIVE
PH, POC: 6
PROTEIN, POC: NEGATIVE MG/DL
SPECIFIC GRAVITY, POC: 1.02
UROBILINOGEN, POC: 0.2 MG/DL

## 2025-07-14 RX ORDER — PHENAZOPYRIDINE HYDROCHLORIDE 100 MG/1
100 TABLET, FILM COATED ORAL 3 TIMES DAILY PRN
Qty: 30 TABLET | Refills: 0 | Status: SHIPPED | OUTPATIENT
Start: 2025-07-14 | End: 2026-07-14

## 2025-07-14 RX ORDER — TRAZODONE HYDROCHLORIDE 50 MG/1
50 TABLET ORAL NIGHTLY PRN
Qty: 90 TABLET | Refills: 0 | Status: SHIPPED | OUTPATIENT
Start: 2025-07-14

## 2025-07-14 RX ORDER — METOPROLOL SUCCINATE 25 MG/1
TABLET, EXTENDED RELEASE ORAL
Qty: 90 TABLET | Refills: 3 | Status: SHIPPED | OUTPATIENT
Start: 2025-07-14

## 2025-07-14 SDOH — ECONOMIC STABILITY: FOOD INSECURITY: WITHIN THE PAST 12 MONTHS, THE FOOD YOU BOUGHT JUST DIDN'T LAST AND YOU DIDN'T HAVE MONEY TO GET MORE.: NEVER TRUE

## 2025-07-14 SDOH — ECONOMIC STABILITY: FOOD INSECURITY: WITHIN THE PAST 12 MONTHS, YOU WORRIED THAT YOUR FOOD WOULD RUN OUT BEFORE YOU GOT MONEY TO BUY MORE.: NEVER TRUE

## 2025-07-14 ASSESSMENT — PATIENT HEALTH QUESTIONNAIRE - PHQ9
5. POOR APPETITE OR OVEREATING: NOT AT ALL
3. TROUBLE FALLING OR STAYING ASLEEP: SEVERAL DAYS
SUM OF ALL RESPONSES TO PHQ QUESTIONS 1-9: 2
8. MOVING OR SPEAKING SO SLOWLY THAT OTHER PEOPLE COULD HAVE NOTICED. OR THE OPPOSITE, BEING SO FIGETY OR RESTLESS THAT YOU HAVE BEEN MOVING AROUND A LOT MORE THAN USUAL: NOT AT ALL
1. LITTLE INTEREST OR PLEASURE IN DOING THINGS: NOT AT ALL
6. FEELING BAD ABOUT YOURSELF - OR THAT YOU ARE A FAILURE OR HAVE LET YOURSELF OR YOUR FAMILY DOWN: NOT AT ALL
SUM OF ALL RESPONSES TO PHQ QUESTIONS 1-9: 2
10. IF YOU CHECKED OFF ANY PROBLEMS, HOW DIFFICULT HAVE THESE PROBLEMS MADE IT FOR YOU TO DO YOUR WORK, TAKE CARE OF THINGS AT HOME, OR GET ALONG WITH OTHER PEOPLE: NOT DIFFICULT AT ALL
4. FEELING TIRED OR HAVING LITTLE ENERGY: SEVERAL DAYS
9. THOUGHTS THAT YOU WOULD BE BETTER OFF DEAD, OR OF HURTING YOURSELF: NOT AT ALL
SUM OF ALL RESPONSES TO PHQ QUESTIONS 1-9: 2
2. FEELING DOWN, DEPRESSED OR HOPELESS: NOT AT ALL
7. TROUBLE CONCENTRATING ON THINGS, SUCH AS READING THE NEWSPAPER OR WATCHING TELEVISION: NOT AT ALL
SUM OF ALL RESPONSES TO PHQ QUESTIONS 1-9: 2

## 2025-07-14 ASSESSMENT — ENCOUNTER SYMPTOMS
CHEST TIGHTNESS: 0
VOMITING: 0
CONSTIPATION: 0
SHORTNESS OF BREATH: 0
COUGH: 0
DIARRHEA: 0
NAUSEA: 0
ABDOMINAL PAIN: 0

## 2025-07-14 NOTE — PROGRESS NOTES
[facility]  Department of Family Medicine  Family Medicine Residency Program      Patient: Mari Russo 65 y.o. female     Date of Service: 7/14/25      Chief complaint:   Chief Complaint   Patient presents with    Medication Refill    Urinary Frequency     Frequency / Urgency        HISTORY OF PRESENTING ILLNESS     65 y.o. female presented to the clinic      Urinary urgency:  - having urgency x few wks.   -hx of kidney stones.   - thinks she passed stone few days ago.   - having suprapubic pain.   - radiation to back.   - did have low fever last wk.   - hx of lupus, seeing rheumatologist in [facility].   - denies dysuria, hematuria.   - denies urinary incontinence,.   - hx of 5 vaginal deliveries.       Afib, hypercoagulable state. :  - is on Eliquis 5 mg BID.   - is not taking metoprolol for 5-6 wks,was not sure why she was taking that.   - wants refill now /      Health Maintenance:  Health Maintenance Due   Topic Date Due    HIV screen  Never done    Hepatitis C screen  Never done    DTaP/Tdap/Td vaccine (1 - Tdap) Never done    Pneumococcal 50+ years Vaccine (1 of 2 - PCV) Never done    Breast cancer screen  Never done    Shingles vaccine (1 of 2) Never done    Lipids  03/09/2022    Respiratory Syncytial Virus (RSV) Pregnant or age 60 yrs+ (1 - Risk 60-74 years 1-dose series) Never done    A1C test (Diabetic or Prediabetic)  08/11/2022    Annual Wellness Visit (Medicare)  Never done    COVID-19 Vaccine (1 - 2024-25 season) Never done    Depression Monitoring  06/20/2025     Past Medical History:      Diagnosis Date    Anxiety and depression     Atrial fibrillation (HCC)     Fried's esophagus with dysplasia     Bleeding tendency     due to blood thinners    COVID-19 04/04/2021    Headache     3-5x per month, 4/10 in the painscale    Hx of deep venous thrombosis     Lupus     Stroke (HCC)     appox 2010     Past Surgical History:        Procedure Laterality Date    APPENDECTOMY

## 2025-07-14 NOTE — PROGRESS NOTES
S: 65 y.o. female with   Chief Complaint   Patient presents with    Medication Refill    Urinary Frequency     Frequency / Urgency        Pt is here bc of urinary urgency and frequency.  Has a hx of kidney stones.     O: VS:  height is 1.753 m (5' 9\") and weight is 82.6 kg (182 lb). Her temporal temperature is 97.8 °F (36.6 °C). Her blood pressure is 117/79 and her pulse is 88. Her respiration is 18 and oxygen saturation is 97%.   BP Readings from Last 3 Encounters:   07/14/25 117/79   11/19/24 120/64   11/20/24 120/64     See resident note    Impression/Plan:   1) urinary urgency - UA and culture sent.   2) afib - pt ed to restart her metoprolol.  Eliquis refilled.  3) prev - RTC for AWV      Health Maintenance Due   Topic Date Due    HIV screen  Never done    Hepatitis C screen  Never done    DTaP/Tdap/Td vaccine (1 - Tdap) Never done    Pneumococcal 50+ years Vaccine (1 of 2 - PCV) Never done    Breast cancer screen  Never done    Shingles vaccine (1 of 2) Never done    Lipids  03/09/2022    Respiratory Syncytial Virus (RSV) Pregnant or age 60 yrs+ (1 - Risk 60-74 years 1-dose series) Never done    A1C test (Diabetic or Prediabetic)  08/11/2022    Annual Wellness Visit (Medicare)  Never done    COVID-19 Vaccine (1 - 2024-25 season) Never done    Depression Monitoring  06/20/2025         Attending Physician Statement  I have discussed the case, including pertinent history and exam findings with the resident.  I agree with the documented assessment and plan.      Vika Haynes MD

## 2025-07-15 LAB
BACTERIA: ABNORMAL
BILIRUBIN, URINE: NEGATIVE
COLOR, UA: YELLOW
EPITHELIAL CELLS, UA: ABNORMAL /HPF
GLUCOSE URINE: NEGATIVE MG/DL
KETONES, URINE: NEGATIVE MG/DL
LEUKOCYTE ESTERASE, URINE: ABNORMAL
NITRITE, URINE: NEGATIVE
PH, URINE: 6 (ref 5–8)
PROTEIN UA: NEGATIVE MG/DL
RBC UA: ABNORMAL /HPF
SPECIFIC GRAVITY UA: 1.01 (ref 1–1.03)
TURBIDITY: ABNORMAL
URINE HGB: ABNORMAL
UROBILINOGEN, URINE: 0.2 EU/DL (ref 0–1)
WBC UA: ABNORMAL /HPF

## 2025-07-16 LAB
CULTURE: ABNORMAL
SPECIMEN DESCRIPTION: ABNORMAL

## 2025-07-17 ENCOUNTER — RESULTS FOLLOW-UP (OUTPATIENT)
Dept: FAMILY MEDICINE CLINIC | Age: 65
End: 2025-07-17

## 2025-07-17 DIAGNOSIS — R39.15 URINARY URGENCY: Primary | ICD-10-CM

## 2025-07-18 RX ORDER — PHENAZOPYRIDINE HYDROCHLORIDE 200 MG/1
200 TABLET, FILM COATED ORAL 3 TIMES DAILY PRN
Qty: 9 TABLET | Refills: 0 | Status: SHIPPED | OUTPATIENT
Start: 2025-07-18 | End: 2025-07-21

## (undated) DEVICE — CONNECTOR IRRIGATION AUXILIARY H2O JET W/ PRT MTL THRD HYDR

## (undated) DEVICE — FORCEPS BX L160CM JAW DIA2.4MM YEL L CAP W/ NDL DISP RAD

## (undated) DEVICE — BITEBLOCK 54FR W/ DENT RIM BLOX

## (undated) DEVICE — FORMALIN  10%NBF 60ML PREFLL CONT

## (undated) DEVICE — DEFENDO AIR WATER SUCTION AND BIOPSY VALVE KIT FOR  OLYMPUS: Brand: DEFENDO AIR/WATER/SUCTION AND BIOPSY VALVE

## (undated) DEVICE — Device: Brand: BALLOON3

## (undated) DEVICE — TRAP POLYP ETRAP

## (undated) DEVICE — SNARE VASC L240CM LOOP W10MM SHTH DIA2.4MM RND STIFF CLD

## (undated) DEVICE — CANNULA NSL ORAL AD FOR CAPNOFLEX CO2 O2 AIRLFE

## (undated) DEVICE — GAUZE,SPONGE,4"X4",8PLY,STRL,LF,10/TRAY: Brand: MEDLINE